# Patient Record
Sex: MALE | Race: WHITE | NOT HISPANIC OR LATINO | Employment: FULL TIME | URBAN - METROPOLITAN AREA
[De-identification: names, ages, dates, MRNs, and addresses within clinical notes are randomized per-mention and may not be internally consistent; named-entity substitution may affect disease eponyms.]

---

## 2017-09-29 DIAGNOSIS — Z00.00 ROUTINE GENERAL MEDICAL EXAMINATION AT A HEALTH CARE FACILITY: Primary | ICD-10-CM

## 2017-10-16 ENCOUNTER — DOCUMENTATION ONLY (OUTPATIENT)
Dept: CARDIOLOGY | Facility: CLINIC | Age: 47
End: 2017-10-16

## 2017-10-16 ENCOUNTER — CLINICAL SUPPORT (OUTPATIENT)
Dept: INTERNAL MEDICINE | Facility: CLINIC | Age: 47
End: 2017-10-16
Payer: COMMERCIAL

## 2017-10-16 ENCOUNTER — CLINICAL SUPPORT (OUTPATIENT)
Dept: AUDIOLOGY | Facility: CLINIC | Age: 47
End: 2017-10-16
Payer: COMMERCIAL

## 2017-10-16 ENCOUNTER — CLINICAL SUPPORT (OUTPATIENT)
Dept: CARDIOLOGY | Facility: CLINIC | Age: 47
End: 2017-10-16
Payer: COMMERCIAL

## 2017-10-16 ENCOUNTER — OFFICE VISIT (OUTPATIENT)
Dept: INTERNAL MEDICINE | Facility: CLINIC | Age: 47
End: 2017-10-16
Payer: COMMERCIAL

## 2017-10-16 ENCOUNTER — PROCEDURE VISIT (OUTPATIENT)
Dept: PULMONOLOGY | Facility: CLINIC | Age: 47
End: 2017-10-16
Payer: COMMERCIAL

## 2017-10-16 ENCOUNTER — HOSPITAL ENCOUNTER (OUTPATIENT)
Dept: RADIOLOGY | Facility: HOSPITAL | Age: 47
Discharge: HOME | End: 2017-10-16
Attending: FAMILY MEDICINE
Payer: COMMERCIAL

## 2017-10-16 VITALS
WEIGHT: 282.19 LBS | HEIGHT: 73 IN | RESPIRATION RATE: 14 BRPM | SYSTOLIC BLOOD PRESSURE: 122 MMHG | BODY MASS INDEX: 37.4 KG/M2 | DIASTOLIC BLOOD PRESSURE: 70 MMHG | HEART RATE: 66 BPM

## 2017-10-16 VITALS
SYSTOLIC BLOOD PRESSURE: 122 MMHG | HEIGHT: 73 IN | HEART RATE: 66 BPM | BODY MASS INDEX: 37.4 KG/M2 | TEMPERATURE: 97 F | DIASTOLIC BLOOD PRESSURE: 70 MMHG | WEIGHT: 282.19 LBS

## 2017-10-16 DIAGNOSIS — Z00.00 ANNUAL PHYSICAL EXAM: ICD-10-CM

## 2017-10-16 DIAGNOSIS — Z00.00 ROUTINE GENERAL MEDICAL EXAMINATION AT A HEALTH CARE FACILITY: ICD-10-CM

## 2017-10-16 DIAGNOSIS — Z00.00 ROUTINE GENERAL MEDICAL EXAMINATION AT A HEALTH CARE FACILITY: Primary | ICD-10-CM

## 2017-10-16 DIAGNOSIS — Z01.12 ENCOUNTER FOR HEARING CONSERVATION AND TREATMENT: Primary | ICD-10-CM

## 2017-10-16 DIAGNOSIS — Z00.00 ANNUAL PHYSICAL EXAM: Primary | ICD-10-CM

## 2017-10-16 DIAGNOSIS — E34.9 HYPOTESTOSTERONEMIA: ICD-10-CM

## 2017-10-16 LAB
ALBUMIN SERPL BCP-MCNC: 3.7 G/DL
ALP SERPL-CCNC: 78 U/L
ALT SERPL W/O P-5'-P-CCNC: 20 U/L
ANION GAP SERPL CALC-SCNC: 8 MMOL/L
AST SERPL-CCNC: 17 U/L
BILIRUB SERPL-MCNC: 0.6 MG/DL
BILIRUB UR QL STRIP: NEGATIVE
BUN SERPL-MCNC: 12 MG/DL
CALCIUM SERPL-MCNC: 9.7 MG/DL
CHLORIDE SERPL-SCNC: 107 MMOL/L
CHOLEST SERPL-MCNC: 166 MG/DL
CHOLEST/HDLC SERPL: 3.9 {RATIO}
CLARITY UR: CLEAR
CO2 SERPL-SCNC: 24 MMOL/L
COLOR UR: YELLOW
CREAT SERPL-MCNC: 0.8 MG/DL
DIASTOLIC DYSFUNCTION: NO
ERYTHROCYTE [DISTWIDTH] IN BLOOD BY AUTOMATED COUNT: 13.5 %
EST. GFR  (AFRICAN AMERICAN): >60 ML/MIN/1.73 M^2
EST. GFR  (NON AFRICAN AMERICAN): >60 ML/MIN/1.73 M^2
ESTIMATED AVG GLUCOSE: 111 MG/DL
GLUCOSE SERPL-MCNC: 111 MG/DL
GLUCOSE UR QL STRIP: NEGATIVE
HBA1C MFR BLD HPLC: 5.5 %
HCT VFR BLD AUTO: 44.1 %
HDLC SERPL-MCNC: 43 MG/DL
HDLC SERPL: 25.9 %
HGB BLD-MCNC: 15 G/DL
HGB UR QL STRIP: NEGATIVE
KETONES UR QL STRIP: NEGATIVE
LDLC SERPL CALC-MCNC: 104.2 MG/DL
LEUKOCYTE ESTERASE UR QL STRIP: NEGATIVE
MCH RBC QN AUTO: 28.6 PG
MCHC RBC AUTO-ENTMCNC: 34 G/DL
MCV RBC AUTO: 84 FL
NITRITE UR QL STRIP: NEGATIVE
NONHDLC SERPL-MCNC: 123 MG/DL
PH UR STRIP: 6 [PH] (ref 5–8)
PLATELET # BLD AUTO: 257 K/UL
PMV BLD AUTO: 9.6 FL
POTASSIUM SERPL-SCNC: 4.4 MMOL/L
PROT SERPL-MCNC: 7.2 G/DL
PROT UR QL STRIP: NEGATIVE
RBC # BLD AUTO: 5.25 M/UL
SODIUM SERPL-SCNC: 139 MMOL/L
SP GR UR STRIP: 1.02 (ref 1–1.03)
TRIGL SERPL-MCNC: 94 MG/DL
TSH SERPL DL<=0.005 MIU/L-ACNC: 0.69 UIU/ML
URN SPEC COLLECT METH UR: NORMAL
WBC # BLD AUTO: 6.98 K/UL

## 2017-10-16 PROCEDURE — 84402 ASSAY OF FREE TESTOSTERONE: CPT

## 2017-10-16 PROCEDURE — 94010 BREATHING CAPACITY TEST: CPT | Mod: 26,S$PBB,, | Performed by: INTERNAL MEDICINE

## 2017-10-16 PROCEDURE — 92552 PURE TONE AUDIOMETRY AIR: CPT | Mod: PBBFAC,PO | Performed by: AUDIOLOGIST

## 2017-10-16 PROCEDURE — 84443 ASSAY THYROID STIM HORMONE: CPT

## 2017-10-16 PROCEDURE — 99386 PREV VISIT NEW AGE 40-64: CPT | Mod: S$PBB,,, | Performed by: FAMILY MEDICINE

## 2017-10-16 PROCEDURE — 93005 ELECTROCARDIOGRAM TRACING: CPT | Mod: PBBFAC,PO | Performed by: NUCLEAR MEDICINE

## 2017-10-16 PROCEDURE — 80061 LIPID PANEL: CPT | Mod: PO

## 2017-10-16 PROCEDURE — 93018 CV STRESS TEST I&R ONLY: CPT | Mod: S$PBB,,, | Performed by: NUCLEAR MEDICINE

## 2017-10-16 PROCEDURE — 80053 COMPREHEN METABOLIC PANEL: CPT | Mod: PO

## 2017-10-16 PROCEDURE — 85027 COMPLETE CBC AUTOMATED: CPT | Mod: PO

## 2017-10-16 PROCEDURE — 99999 PR PBB SHADOW E&M-EST. PATIENT-LVL III: CPT | Mod: PBBFAC,,, | Performed by: FAMILY MEDICINE

## 2017-10-16 PROCEDURE — 83655 ASSAY OF LEAD: CPT

## 2017-10-16 PROCEDURE — 71020 XR CHEST PA AND LATERAL: CPT | Mod: TC,PO

## 2017-10-16 PROCEDURE — 94010 BREATHING CAPACITY TEST: CPT | Mod: PBBFAC,PO

## 2017-10-16 PROCEDURE — 93017 CV STRESS TEST TRACING ONLY: CPT | Mod: PBBFAC,PO | Performed by: NUCLEAR MEDICINE

## 2017-10-16 PROCEDURE — 83036 HEMOGLOBIN GLYCOSYLATED A1C: CPT

## 2017-10-16 PROCEDURE — 86703 HIV-1/HIV-2 1 RESULT ANTBDY: CPT

## 2017-10-16 PROCEDURE — 84153 ASSAY OF PSA TOTAL: CPT

## 2017-10-16 PROCEDURE — 81003 URINALYSIS AUTO W/O SCOPE: CPT | Mod: PO

## 2017-10-16 PROCEDURE — 71020 XR CHEST PA AND LATERAL: CPT | Mod: 26,,, | Performed by: RADIOLOGY

## 2017-10-16 PROCEDURE — 93016 CV STRESS TEST SUPVJ ONLY: CPT | Mod: S$PBB,,, | Performed by: NUCLEAR MEDICINE

## 2017-10-16 PROCEDURE — 93010 ELECTROCARDIOGRAM REPORT: CPT | Mod: S$PBB,,, | Performed by: NUCLEAR MEDICINE

## 2017-10-16 RX ORDER — CETIRIZINE HYDROCHLORIDE 10 MG/1
10 TABLET ORAL DAILY
COMMUNITY

## 2017-10-16 RX ORDER — TESTOSTERONE 30 MG/1.5ML
SOLUTION TOPICAL
COMMUNITY
Start: 2014-02-28 | End: 2018-10-11

## 2017-10-16 NOTE — PROGRESS NOTES
Subjective:       Patient ID: Narendra Watson is a 47 y.o. male.    Chief Complaint: Annual Exam    Annual exam:      Pt is a 47 year old who has no ongoing medical issues except for hypotestosteronism. Father  at 61 from CAD.      Review of Systems   Constitutional: Negative.    HENT: Negative.    Respiratory: Negative.    Cardiovascular: Negative.    Gastrointestinal: Negative.    Skin: Negative.    Neurological: Negative.    Psychiatric/Behavioral: Negative.        Objective:      Physical Exam   Constitutional: He is oriented to person, place, and time. He appears well-developed and well-nourished.   Cardiovascular: Normal rate and regular rhythm.    Pulmonary/Chest: Effort normal and breath sounds normal.   Abdominal: Soft. Bowel sounds are normal.   Neurological: He is alert and oriented to person, place, and time.   Skin: Skin is warm and dry.   Psychiatric: He has a normal mood and affect. His behavior is normal.       Assessment:       1. Annual physical exam    2. Hypotestosteronemia        Plan:       Annual physical exam  Comments:  Overall good health.  Orders:  -     PSA, Screening; Future; Expected date: 10/16/2017  -     Testosterone, free; Future; Expected date: 10/16/2017    Hypotestosteronemia  Comments:  Continue on the Testosterone

## 2017-10-16 NOTE — PROGRESS NOTES
Pt presented today for executive health check with exercise treadmill stress test - he had 2 beat runs of VT at peak exercise. No CP, no palpitations, no other sys noted.

## 2017-10-16 NOTE — PROGRESS NOTES
Executive Health Screening    Narendra Watson was seen 10/16/2017 for an [facility] hearing screen.  He is a bilateral cochlear implant user.  He has been using his right cochlear implant for 2 years, and his left for 3 years. He coninues care and maintenance at [facility].    Results reveal a profound hearing loss 250-8000 Hz for the right ear, and  profound hearing loss 250-8000 Hz for the left ear.     Otoscopy was unremarkable.    Patient was counseled on the above findings.

## 2017-10-17 LAB
CITY: NORMAL
COMPLEXED PSA SERPL-MCNC: 1.7 NG/ML
COUNTY: NORMAL
GUARDIAN FIRST NAME: NORMAL
GUARDIAN LAST NAME: NORMAL
HIV 1+2 AB+HIV1 P24 AG SERPL QL IA: NEGATIVE
LEAD BLD-MCNC: <1 MCG/DL (ref 0–4.9)
PHONE #: NORMAL
POSTAL CODE: NORMAL
RACE: NORMAL
SPECIMEN SOURCE: NORMAL
STATE OF RESIDENCE: NORMAL
STREET ADDRESS: NORMAL

## 2017-10-18 LAB — TESTOST FREE SERPL-MCNC: 7.7 PG/ML

## 2017-11-18 LAB
PRE FEV1 FVC: 71.9 % (ref 68.88–88.24)
PRE FEV1: 3.45 L (ref 3.58–5.29)
PRE FVC: 4.8 L (ref 4.66–6.69)
PRE PEF: 6.76 L/S (ref 8.17–13.21)

## 2018-01-15 PROBLEM — Z00.00 ANNUAL PHYSICAL EXAM: Status: RESOLVED | Noted: 2017-10-16 | Resolved: 2018-01-15

## 2018-09-18 DIAGNOSIS — Z00.00 ROUTINE GENERAL MEDICAL EXAMINATION AT A HEALTH CARE FACILITY: Primary | ICD-10-CM

## 2018-09-27 ENCOUNTER — PATIENT OUTREACH (OUTPATIENT)
Dept: ADMINISTRATIVE | Facility: HOSPITAL | Age: 48
End: 2018-09-27

## 2018-10-11 ENCOUNTER — CLINICAL SUPPORT (OUTPATIENT)
Dept: AUDIOLOGY | Facility: CLINIC | Age: 48
End: 2018-10-11

## 2018-10-11 ENCOUNTER — CLINICAL SUPPORT (OUTPATIENT)
Dept: PULMONOLOGY | Facility: CLINIC | Age: 48
End: 2018-10-11

## 2018-10-11 ENCOUNTER — OFFICE VISIT (OUTPATIENT)
Dept: INTERNAL MEDICINE | Facility: CLINIC | Age: 48
End: 2018-10-11
Payer: COMMERCIAL

## 2018-10-11 ENCOUNTER — CLINICAL SUPPORT (OUTPATIENT)
Dept: INTERNAL MEDICINE | Facility: CLINIC | Age: 48
End: 2018-10-11
Payer: COMMERCIAL

## 2018-10-11 VITALS
BODY MASS INDEX: 33.9 KG/M2 | DIASTOLIC BLOOD PRESSURE: 72 MMHG | SYSTOLIC BLOOD PRESSURE: 132 MMHG | RESPIRATION RATE: 14 BRPM | WEIGHT: 255.75 LBS | HEIGHT: 73 IN | HEART RATE: 56 BPM

## 2018-10-11 DIAGNOSIS — E34.9 HYPOTESTOSTERONEMIA: ICD-10-CM

## 2018-10-11 DIAGNOSIS — Z00.00 ROUTINE GENERAL MEDICAL EXAMINATION AT A HEALTH CARE FACILITY: Primary | ICD-10-CM

## 2018-10-11 DIAGNOSIS — Z00.00 ENCOUNTER FOR PREVENTIVE HEALTH EXAMINATION: ICD-10-CM

## 2018-10-11 DIAGNOSIS — Z00.00 ENCOUNTER FOR PREVENTIVE HEALTH EXAMINATION: Primary | ICD-10-CM

## 2018-10-11 DIAGNOSIS — Z01.12 HEARING CONSERVATION AND TREATMENT EXAM: Primary | ICD-10-CM

## 2018-10-11 DIAGNOSIS — J30.1 SEASONAL ALLERGIC RHINITIS DUE TO POLLEN: ICD-10-CM

## 2018-10-11 DIAGNOSIS — Z00.00 ROUTINE GENERAL MEDICAL EXAMINATION AT A HEALTH CARE FACILITY: ICD-10-CM

## 2018-10-11 LAB
ALBUMIN SERPL BCP-MCNC: 4 G/DL
ALP SERPL-CCNC: 70 U/L
ALT SERPL W/O P-5'-P-CCNC: 17 U/L
ANION GAP SERPL CALC-SCNC: 10 MMOL/L
AST SERPL-CCNC: 16 U/L
BILIRUB SERPL-MCNC: 0.6 MG/DL
BILIRUB UR QL STRIP: NEGATIVE
BRPFT: NORMAL
BUN SERPL-MCNC: 15 MG/DL
CALCIUM SERPL-MCNC: 9.9 MG/DL
CHLORIDE SERPL-SCNC: 106 MMOL/L
CHOLEST SERPL-MCNC: 153 MG/DL
CHOLEST/HDLC SERPL: 4.3 {RATIO}
CLARITY UR: CLEAR
CO2 SERPL-SCNC: 25 MMOL/L
COLOR UR: YELLOW
COMPLEXED PSA SERPL-MCNC: 0.89 NG/ML
CREAT SERPL-MCNC: 0.8 MG/DL
ERYTHROCYTE [DISTWIDTH] IN BLOOD BY AUTOMATED COUNT: 13.7 %
EST. GFR  (AFRICAN AMERICAN): >60 ML/MIN/1.73 M^2
EST. GFR  (NON AFRICAN AMERICAN): >60 ML/MIN/1.73 M^2
ESTIMATED AVG GLUCOSE: 103 MG/DL
FEF 25 75 LLN: 2.21
FEF 25 75 PRE REF: 62.2 %
FEF 25 75 REF: 3.99
FEV1 FVC LLN: 68
FEV1 FVC PRE REF: 89 %
FEV1 FVC REF: 79
FEV1 LLN: 3.44
FEV1 PRE REF: 83.9 %
FEV1 REF: 4.38
FEV6 LLN: 4.47
FEV6 PRE REF: 90.7 %
FEV6 PRE: 4.94 L (ref 4.47–6.43)
FEV6 REF: 5.45
FVC LLN: 4.38
FVC PRE REF: 93.8 %
FVC REF: 5.58
GLUCOSE SERPL-MCNC: 106 MG/DL
GLUCOSE UR QL STRIP: NEGATIVE
HBA1C MFR BLD HPLC: 5.2 %
HCT VFR BLD AUTO: 44.5 %
HDLC SERPL-MCNC: 36 MG/DL
HDLC SERPL: 23.5 %
HGB BLD-MCNC: 14.8 G/DL
HGB UR QL STRIP: NEGATIVE
KETONES UR QL STRIP: NEGATIVE
LDLC SERPL CALC-MCNC: 101.8 MG/DL
LEUKOCYTE ESTERASE UR QL STRIP: NEGATIVE
MCH RBC QN AUTO: 28.5 PG
MCHC RBC AUTO-ENTMCNC: 33.3 G/DL
MCV RBC AUTO: 86 FL
MVV LLN: 139
MVV REF: 164
NITRITE UR QL STRIP: NEGATIVE
NONHDLC SERPL-MCNC: 117 MG/DL
PEF LLN: 8.1
PEF PRE REF: 83 %
PEF REF: 10.61
PH UR STRIP: 6 [PH] (ref 5–8)
PLATELET # BLD AUTO: 244 K/UL
PMV BLD AUTO: 9.6 FL
POTASSIUM SERPL-SCNC: 4.2 MMOL/L
PRE FEF 25 75: 2.48 L/S (ref 2.21–5.77)
PRE FET 100: 13.14 SEC
PRE FEV1 FVC: 70.3 % (ref 68.36–89.65)
PRE FEV1: 3.68 L (ref 3.44–5.32)
PRE FVC: 5.23 L (ref 4.38–6.77)
PRE PEF: 8.8 L/S (ref 8.1–13.12)
PROT SERPL-MCNC: 7 G/DL
PROT UR QL STRIP: NEGATIVE
RBC # BLD AUTO: 5.19 M/UL
SODIUM SERPL-SCNC: 141 MMOL/L
SP GR UR STRIP: 1.02 (ref 1–1.03)
TESTOST SERPL-MCNC: 159 NG/DL
TRIGL SERPL-MCNC: 76 MG/DL
TSH SERPL DL<=0.005 MIU/L-ACNC: 0.74 UIU/ML
URN SPEC COLLECT METH UR: NORMAL
WBC # BLD AUTO: 6.83 K/UL

## 2018-10-11 PROCEDURE — 85027 COMPLETE CBC AUTOMATED: CPT | Mod: PO

## 2018-10-11 PROCEDURE — 83655 ASSAY OF LEAD: CPT

## 2018-10-11 PROCEDURE — 81003 URINALYSIS AUTO W/O SCOPE: CPT | Mod: PO

## 2018-10-11 PROCEDURE — 99999 PR PBB SHADOW E&M-EST. PATIENT-LVL I: CPT | Mod: PBBFAC,,, | Performed by: INTERNAL MEDICINE

## 2018-10-11 PROCEDURE — 99396 PREV VISIT EST AGE 40-64: CPT | Mod: 25,S$GLB,, | Performed by: INTERNAL MEDICINE

## 2018-10-11 PROCEDURE — 90471 IMMUNIZATION ADMIN: CPT | Mod: S$GLB,,, | Performed by: INTERNAL MEDICINE

## 2018-10-11 PROCEDURE — 84403 ASSAY OF TOTAL TESTOSTERONE: CPT

## 2018-10-11 PROCEDURE — 83036 HEMOGLOBIN GLYCOSYLATED A1C: CPT

## 2018-10-11 PROCEDURE — 84443 ASSAY THYROID STIM HORMONE: CPT

## 2018-10-11 PROCEDURE — 94010 BREATHING CAPACITY TEST: CPT | Mod: S$GLB,,, | Performed by: INTERNAL MEDICINE

## 2018-10-11 PROCEDURE — 84402 ASSAY OF FREE TESTOSTERONE: CPT

## 2018-10-11 PROCEDURE — 90686 IIV4 VACC NO PRSV 0.5 ML IM: CPT | Mod: S$GLB,,, | Performed by: INTERNAL MEDICINE

## 2018-10-11 PROCEDURE — 80061 LIPID PANEL: CPT | Mod: PO

## 2018-10-11 PROCEDURE — 84153 ASSAY OF PSA TOTAL: CPT

## 2018-10-11 PROCEDURE — 80053 COMPREHEN METABOLIC PANEL: CPT | Mod: PO

## 2018-10-11 RX ORDER — FLUTICASONE PROPIONATE 50 MCG
2 SPRAY, SUSPENSION (ML) NASAL
COMMUNITY
Start: 2018-04-11 | End: 2019-04-11

## 2018-10-11 RX ORDER — TESTOSTERONE CYPIONATE 1000 MG/10ML
INJECTION, SOLUTION INTRAMUSCULAR
COMMUNITY

## 2018-10-11 RX ORDER — ZOLPIDEM TARTRATE 10 MG/1
10 TABLET ORAL
COMMUNITY
Start: 2015-11-05

## 2018-10-11 RX ORDER — NYSTATIN 100000 U/G
OINTMENT TOPICAL
COMMUNITY
Start: 2018-04-13 | End: 2021-10-18

## 2018-10-11 RX ORDER — CELECOXIB 200 MG/1
200 CAPSULE ORAL
COMMUNITY
Start: 2015-12-28

## 2018-10-11 RX ORDER — TRIAMCINOLONE ACETONIDE 0.25 MG/G
OINTMENT TOPICAL
COMMUNITY
Start: 2018-04-13 | End: 2021-10-18

## 2018-10-11 NOTE — PROGRESS NOTES
Executive Health Screening     Narendra Watson was seen 10/11/2018 for an [facility] hearing screen.  He is a bilateral cochlear implant user.  He has been using his right cochlear implant for 3 years, and his left for 4 years. He coninues care and maintenance at [facility]. He declined audio testing today due to not having any residual hearing without the use of his cochlear implants.     Previous results from 10/16/2017 revealed a profound hearing loss 250-8000 Hz for the right ear, and  profound hearing loss 250-8000 Hz for the left ear.      Otoscopy was unremarkable at that time.     Patient was counseled on the above findings and encouraged to follow-up with [facility] for cochlear implant management.

## 2018-10-11 NOTE — LETTER
October 17, 2018    Narendra Watson  [address] [place] [address]             [facility]  [address] [place] [address]  Phone: [phone]  Fax: [phone] [facility] #[id number]    Dear Mr. Watson:    It was a pleasure to meet you and perform your  executive physical on October 11, 2018.  At the time of  your visit, you are 48 years old.  You are active at  work, but do not participate in any regular exercise  activity.  You have been taking testosterone for the  past four months and feel a good energy related to  this.  You have lost 27 pounds with diet alone.  You  denied any fevers, sweats or coughing problems.  You  have had no chest pain, shortness of breath or  palpitations.  Your bowel movements have been good with  fiber supplementation.  You report one time a night  urination.    Past medical history is significant for allergic  rhinitis, hypotestosterone, back surgery, inner ear  surgery.    Your medications include Celebrex, Zyrtec, Flonase,  testosterone, and Ambien p.r.n.    You have no known medication allergies.    Your health maintenance includes October 2018, Fluvax;  December 2014, Prevnar vaccination; July 2018, tetanus,  diphtheria, pertussis vaccination.    On physical exam, your height is 6 feet 1 inch, weight  255 pounds with a body mass index 33.74.  This is in  the overweight range.  Your blood pressure is 132/72,  which is very good.  Your head and neck exam is normal.   Your heart has a regular rate and rhythm with no  abnormal sounds.  Your lungs are clear throughout with  a normal respiratory effort.  Your abdomen is soft with  normal bowel sounds.  No masses or enlarged organs are  noted.  Your extremities have strong distal pulses with  no swelling.    Your labs reveal a normal blood count, platelets 244,  normal.  Your kidney and liver function is normal.   Your fasting glucose is 106, normal.  Total cholesterol  153, HDL 36, ,  "triglycerides 76.  On this  cholesterol panel, your HDL or "good cholesterol" is  low.  This may improve with starting an exercise  regimen plus eating "good fats," which include nuts,  fish, olive oil, avocados.  Hemoglobin A1c 5.2%,  normal.  TSH 0.738, normal.  PSA 0.89, which is good,  normal.  Total testosterone is low at 159, free  testosterone is low at 3.6.  Your pulmonary function  testing is normal.  Your urinalysis is normal.     In summary, you appear to be in pretty good health in  general.  You do need to start some exercise, maybe  just walking 30 minutes four days a week, which would  perhaps help your good cholesterol and also help your  general cardiac status.  Your calculated 10-year risk  of having a heart attack is nice and low at 2.6%.  You  are up to date on all vaccinations and preventive care  needed at this time.    It was a pleasure to see you and perform your executive  physical.  If I can be of any further assistance or if  you have any other questions or concerns, please do not  hesitate to let me know.    Yours very truly,    Kath Aponte M.D.      SHANNAN  dd: 10/17/2018 12:13:59 (CDT)  td: 10/18/2018 05:43:47 (CDT)  Doc ID   #[id number]  Job ID #[id number]    CC:          "

## 2018-10-11 NOTE — PROGRESS NOTES
Subjective:      Patient ID: Narendra Watson is a 48 y.o. male.    Chief Complaint: No chief complaint on file.      HPI  Pt here for second year of Executive physical with [employer], last year Dr. Asif.  No exercising, but active at work.  IM test x 4mo, feels good energy.  Has lost 27# with diet alone.  No f/c/sw/cough.  No cp/sob/palp.  BMs ok with fiber.  Urine 1x nocturia.        Past Medical History:   Diagnosis Date    Allergic rhinitis     Hypotestosteronism        Past Surgical History:   Procedure Laterality Date    BACK SURGERY      INNER EAR SURGERY       SH:  No tob, occas EtOH, .    FH:  Uncle with brain tumor.    HM:  10/18 today fluvax, 12/14 [id number], 7/18 TDaP.      Review of Systems   Constitutional: Negative for appetite change, chills, diaphoresis, fatigue and fever.   HENT: Negative for congestion, ear pain, rhinorrhea and sinus pressure.    Respiratory: Negative for cough and shortness of breath.    Cardiovascular: Negative for chest pain and palpitations.   Gastrointestinal: Negative for abdominal distention, abdominal pain, blood in stool, constipation, diarrhea, nausea and vomiting.   Genitourinary: Negative for difficulty urinating, dysuria, frequency, hematuria and urgency.   Musculoskeletal: Negative for arthralgias.   Skin: Negative for rash.   Neurological: Negative for dizziness and headaches.   Psychiatric/Behavioral: The patient is not nervous/anxious.          Objective:   There were no vitals taken for this visit.    Physical Exam   Constitutional: He is oriented to person, place, and time. He appears well-developed and well-nourished.   HENT:   Right Ear: External ear normal. Tympanic membrane is not injected.   Left Ear: External ear normal. Tympanic membrane is not injected.   Mouth/Throat: Oropharynx is clear and moist.   Eyes: Conjunctivae are normal.   Neck: Normal range of motion. Neck supple. No thyromegaly present.   Cardiovascular: Normal rate, regular  rhythm and intact distal pulses. Exam reveals no gallop and no friction rub.   No murmur heard.  Pulmonary/Chest: Effort normal and breath sounds normal. He has no wheezes. He has no rales.   Abdominal: Soft. Bowel sounds are normal. He exhibits no mass. There is no tenderness.   Musculoskeletal: He exhibits no edema.   Lymphadenopathy:     He has no cervical adenopathy.   Neurological: He is alert and oriented to person, place, and time.   Psychiatric: He has a normal mood and affect.       Results for SALENA VANCE (MRN [id number]) as of 10/11/2018 09:30   Ref. Range 10/11/2018 08:06   WBC Latest Ref Range: 3.90 - 12.70 K/uL 6.83   RBC Latest Ref Range: 4.60 - 6.20 M/uL 5.19   Hemoglobin Latest Ref Range: 14.0 - 18.0 g/dL 14.8   Hematocrit Latest Ref Range: 40.0 - 54.0 % 44.5   MCV Latest Ref Range: 82 - 98 fL 86   MCH Latest Ref Range: 27.0 - 31.0 pg 28.5   MCHC Latest Ref Range: 32.0 - 36.0 g/dL 33.3   RDW Latest Ref Range: 11.5 - 14.5 % 13.7   Platelets Latest Ref Range: 150 - 350 K/uL 244   MPV Latest Ref Range: 9.2 - 12.9 fL 9.6   Sodium Latest Ref Range: 136 - 145 mmol/L 141   Potassium Latest Ref Range: 3.5 - 5.1 mmol/L 4.2   Chloride Latest Ref Range: 95 - 110 mmol/L 106   CO2 Latest Ref Range: 23 - 29 mmol/L 25   Anion Gap Latest Ref Range: 8 - 16 mmol/L 10   BUN, Bld Latest Ref Range: 6 - 20 mg/dL 15   Creatinine Latest Ref Range: 0.5 - 1.4 mg/dL 0.8   eGFR if non African American Latest Ref Range: >60 mL/min/1.73 m^2 >60   eGFR if  Latest Ref Range: >60 mL/min/1.73 m^2 >60   Glucose Latest Ref Range: 70 - 110 mg/dL 106   Calcium Latest Ref Range: 8.7 - 10.5 mg/dL 9.9   Alkaline Phosphatase Latest Ref Range: 55 - 135 U/L 70   Total Protein Latest Ref Range: 6.0 - 8.4 g/dL 7.0   Albumin Latest Ref Range: 3.5 - 5.2 g/dL 4.0   Total Bilirubin Latest Ref Range: 0.1 - 1.0 mg/dL 0.6   AST Latest Ref Range: 10 - 40 U/L 16   ALT Latest Ref Range: 10 - 44 U/L 17   Triglycerides Latest Ref Range: 30  - 150 mg/dL 76   Cholesterol Latest Ref Range: 120 - 199 mg/dL 153   HDL Latest Ref Range: 40 - 75 mg/dL 36 (L)   LDL Cholesterol Latest Ref Range: 63.0 - 159.0 mg/dL 101.8   Total Cholesterol/HDL Ratio Latest Ref Range: 2.0 - 5.0  4.3   Specimen UA Unknown Urine, Clean Catch   Color, UA Latest Ref Range: Yellow, Straw, Mireya  Yellow   pH, UA Latest Ref Range: 5.0 - 8.0  6.0   Specific Gravity, UA Latest Ref Range: 1.005 - 1.030  1.025   Appearance, UA Latest Ref Range: Clear  Clear   Protein, UA Latest Ref Range: Negative  Negative   Glucose, UA Latest Ref Range: Negative  Negative   Ketones, UA Latest Ref Range: Negative  Negative   Occult Blood UA Latest Ref Range: Negative  Negative   Nitrite, UA Latest Ref Range: Negative  Negative   Bilirubin (UA) Latest Ref Range: Negative  Negative   Leukocytes, UA Latest Ref Range: Negative  Negative         Assessment:       1. Encounter for preventive health examination    2. Hypotestosteronemia    3. Seasonal allergic rhinitis due to pollen          Plan:     Encounter for preventive health examination- Report results when available.  -     PSA, Screening; Future; Expected date: 10/11/2018    Hypotestosteronemia  -     PSA, Screening; Future; Expected date: 10/11/2018  -     Testosterone; Future; Expected date: 10/11/2018  -     Testosterone, free; Future; Expected date: 10/11/2018    Seasonal allergic rhinitis due to pollen

## 2018-10-12 LAB
CITY: NORMAL
COUNTY: NORMAL
GUARDIAN FIRST NAME: NORMAL
GUARDIAN LAST NAME: NORMAL
LEAD, BLOOD: <1 MCG/DL (ref 0–4.9)
PHONE #: NORMAL
POSTAL CODE: NORMAL
RACE: NORMAL
SPECIMEN SOURCE: NORMAL
STATE OF RESIDENCE: NORMAL
STREET ADDRESS: NORMAL

## 2018-10-15 LAB — TESTOST FREE SERPL-MCNC: 3.6 PG/ML

## 2019-09-05 DIAGNOSIS — Z00.00 ROUTINE GENERAL MEDICAL EXAMINATION AT A HEALTH CARE FACILITY: Primary | ICD-10-CM

## 2019-10-02 DIAGNOSIS — Z00.00 ROUTINE GENERAL MEDICAL EXAMINATION AT A HEALTH CARE FACILITY: Primary | ICD-10-CM

## 2019-10-22 ENCOUNTER — OFFICE VISIT (OUTPATIENT)
Dept: INTERNAL MEDICINE | Facility: CLINIC | Age: 49
End: 2019-10-22
Payer: COMMERCIAL

## 2019-10-22 ENCOUNTER — CLINICAL SUPPORT (OUTPATIENT)
Dept: INTERNAL MEDICINE | Facility: CLINIC | Age: 49
End: 2019-10-22
Payer: COMMERCIAL

## 2019-10-22 ENCOUNTER — CLINICAL SUPPORT (OUTPATIENT)
Dept: INTERNAL MEDICINE | Facility: CLINIC | Age: 49
End: 2019-10-22

## 2019-10-22 ENCOUNTER — CLINICAL SUPPORT (OUTPATIENT)
Dept: PULMONOLOGY | Facility: CLINIC | Age: 49
End: 2019-10-22

## 2019-10-22 ENCOUNTER — CLINICAL SUPPORT (OUTPATIENT)
Dept: AUDIOLOGY | Facility: CLINIC | Age: 49
End: 2019-10-22

## 2019-10-22 VITALS
RESPIRATION RATE: 16 BRPM | HEIGHT: 73 IN | BODY MASS INDEX: 34.19 KG/M2 | HEART RATE: 65 BPM | WEIGHT: 257.94 LBS | SYSTOLIC BLOOD PRESSURE: 118 MMHG | DIASTOLIC BLOOD PRESSURE: 77 MMHG

## 2019-10-22 DIAGNOSIS — Z01.12 HEARING CONSERVATION AND TREATMENT EXAM: Primary | ICD-10-CM

## 2019-10-22 DIAGNOSIS — Z00.00 ROUTINE GENERAL MEDICAL EXAMINATION AT A HEALTH CARE FACILITY: Primary | ICD-10-CM

## 2019-10-22 DIAGNOSIS — Z00.00 ROUTINE GENERAL MEDICAL EXAMINATION AT A HEALTH CARE FACILITY: ICD-10-CM

## 2019-10-22 PROBLEM — H69.93 EUSTACHIAN TUBE DYSFUNCTION, BILATERAL: Status: ACTIVE | Noted: 2018-08-20

## 2019-10-22 PROBLEM — R51.9 HEADACHE: Status: ACTIVE | Noted: 2019-10-22

## 2019-10-22 LAB
ALBUMIN SERPL BCP-MCNC: 3.9 G/DL (ref 3.5–5.2)
ALP SERPL-CCNC: 69 U/L (ref 55–135)
ALT SERPL W/O P-5'-P-CCNC: 24 U/L (ref 10–44)
ANION GAP SERPL CALC-SCNC: 8 MMOL/L (ref 8–16)
AST SERPL-CCNC: 18 U/L (ref 10–40)
BILIRUB SERPL-MCNC: 0.6 MG/DL (ref 0.1–1)
BILIRUB UR QL STRIP: NEGATIVE
BRPFT: NORMAL
BUN SERPL-MCNC: 12 MG/DL (ref 6–20)
CALCIUM SERPL-MCNC: 9.5 MG/DL (ref 8.7–10.5)
CHLORIDE SERPL-SCNC: 111 MMOL/L (ref 95–110)
CHOLEST SERPL-MCNC: 150 MG/DL (ref 120–199)
CHOLEST/HDLC SERPL: 3.8 {RATIO} (ref 2–5)
CLARITY UR: CLEAR
CO2 SERPL-SCNC: 22 MMOL/L (ref 23–29)
COLOR UR: YELLOW
CREAT SERPL-MCNC: 1 MG/DL (ref 0.5–1.4)
ERYTHROCYTE [DISTWIDTH] IN BLOOD BY AUTOMATED COUNT: 13.2 % (ref 11.5–14.5)
EST. GFR  (AFRICAN AMERICAN): >60 ML/MIN/1.73 M^2
EST. GFR  (NON AFRICAN AMERICAN): >60 ML/MIN/1.73 M^2
ESTIMATED AVG GLUCOSE: 108 MG/DL (ref 68–131)
FEF 25 75 LLN: 2.12
FEF 25 75 PRE REF: 74.5 %
FEF 25 75 REF: 3.86
FEV1 FVC LLN: 68
FEV1 FVC PRE REF: 93.8 %
FEV1 FVC REF: 79
FEV1 LLN: 3.33
FEV1 PRE REF: 86.6 %
FEV1 REF: 4.24
FVC LLN: 4.24
FVC PRE REF: 91.9 %
FVC REF: 5.4
GLUCOSE SERPL-MCNC: 103 MG/DL (ref 70–110)
GLUCOSE UR QL STRIP: NEGATIVE
HBA1C MFR BLD HPLC: 5.4 % (ref 4–5.6)
HCT VFR BLD AUTO: 47.4 % (ref 40–54)
HDLC SERPL-MCNC: 40 MG/DL (ref 40–75)
HDLC SERPL: 26.7 % (ref 20–50)
HGB BLD-MCNC: 15.9 G/DL (ref 14–18)
HGB UR QL STRIP: NEGATIVE
KETONES UR QL STRIP: NEGATIVE
LDLC SERPL CALC-MCNC: 82.8 MG/DL (ref 63–159)
LEUKOCYTE ESTERASE UR QL STRIP: NEGATIVE
MCH RBC QN AUTO: 28.9 PG (ref 27–31)
MCHC RBC AUTO-ENTMCNC: 33.5 G/DL (ref 32–36)
MCV RBC AUTO: 86 FL (ref 82–98)
NITRITE UR QL STRIP: NEGATIVE
NONHDLC SERPL-MCNC: 110 MG/DL
PEF LLN: 7.93
PEF PRE REF: 81.6 %
PEF REF: 10.38
PH UR STRIP: 6 [PH] (ref 5–8)
PLATELET # BLD AUTO: 280 K/UL (ref 150–350)
PMV BLD AUTO: 9.6 FL (ref 9.2–12.9)
POTASSIUM SERPL-SCNC: 4.3 MMOL/L (ref 3.5–5.1)
PRE FEF 25 75: 2.87 L/S (ref 2.12–5.6)
PRE FET 100: 6.88 SEC
PRE FEV1 FVC: 74.09 % (ref 68.25–89.72)
PRE FEV1: 3.68 L (ref 3.33–5.16)
PRE FVC: 4.96 L (ref 4.24–6.57)
PRE PEF: 8.47 L/S (ref 7.93–12.84)
PROT SERPL-MCNC: 7 G/DL (ref 6–8.4)
PROT UR QL STRIP: NEGATIVE
RBC # BLD AUTO: 5.51 M/UL (ref 4.6–6.2)
SODIUM SERPL-SCNC: 141 MMOL/L (ref 136–145)
SP GR UR STRIP: 1.01 (ref 1–1.03)
TRIGL SERPL-MCNC: 136 MG/DL (ref 30–150)
TSH SERPL DL<=0.005 MIU/L-ACNC: 0.91 UIU/ML (ref 0.4–4)
URN SPEC COLLECT METH UR: NORMAL
WBC # BLD AUTO: 7.54 K/UL (ref 3.9–12.7)

## 2019-10-22 PROCEDURE — 80053 COMPREHEN METABOLIC PANEL: CPT

## 2019-10-22 PROCEDURE — 83036 HEMOGLOBIN GLYCOSYLATED A1C: CPT

## 2019-10-22 PROCEDURE — 84443 ASSAY THYROID STIM HORMONE: CPT

## 2019-10-22 PROCEDURE — 81003 URINALYSIS AUTO W/O SCOPE: CPT

## 2019-10-22 PROCEDURE — 80061 LIPID PANEL: CPT

## 2019-10-22 PROCEDURE — 99999 PR PBB SHADOW E&M-EST. PATIENT-LVL III: ICD-10-PCS | Mod: PBBFAC,,, | Performed by: FAMILY MEDICINE

## 2019-10-22 PROCEDURE — 94010 BREATHING CAPACITY TEST: CPT | Mod: S$GLB,,, | Performed by: INTERNAL MEDICINE

## 2019-10-22 PROCEDURE — 99396 PR PREVENTIVE VISIT,EST,40-64: ICD-10-PCS | Mod: S$GLB,,, | Performed by: FAMILY MEDICINE

## 2019-10-22 PROCEDURE — 99396 PREV VISIT EST AGE 40-64: CPT | Mod: S$GLB,,, | Performed by: FAMILY MEDICINE

## 2019-10-22 PROCEDURE — 94010 BREATHING CAPACITY TEST: ICD-10-PCS | Mod: S$GLB,,, | Performed by: INTERNAL MEDICINE

## 2019-10-22 PROCEDURE — 83655 ASSAY OF LEAD: CPT

## 2019-10-22 PROCEDURE — 99999 PR PBB SHADOW E&M-EST. PATIENT-LVL III: CPT | Mod: PBBFAC,,, | Performed by: FAMILY MEDICINE

## 2019-10-22 PROCEDURE — 85027 COMPLETE CBC AUTOMATED: CPT

## 2019-10-22 RX ORDER — ONDANSETRON 4 MG/1
TABLET, FILM COATED ORAL
Refills: 2 | COMMUNITY
Start: 2019-10-01

## 2019-10-22 RX ORDER — DIAZEPAM 5 MG/1
TABLET ORAL
COMMUNITY
Start: 2019-01-25 | End: 2019-10-22

## 2019-10-22 RX ORDER — LORAZEPAM 1 MG/1
1 TABLET ORAL EVERY 8 HOURS PRN
COMMUNITY
Start: 2019-05-14 | End: 2019-10-22

## 2019-10-22 RX ORDER — RIZATRIPTAN BENZOATE 10 MG/1
10 TABLET ORAL
COMMUNITY

## 2019-10-22 RX ORDER — MECLIZINE HYDROCHLORIDE 25 MG/1
TABLET ORAL
COMMUNITY
Start: 2019-01-23

## 2019-10-22 RX ORDER — HYDROXYZINE HYDROCHLORIDE 50 MG/1
TABLET, FILM COATED ORAL
COMMUNITY
Start: 2019-04-08 | End: 2019-10-22

## 2019-10-22 RX ORDER — TOPIRAMATE 50 MG/1
50 TABLET, FILM COATED ORAL 2 TIMES DAILY
COMMUNITY
End: 2021-10-18

## 2019-10-22 NOTE — PROGRESS NOTES
Subjective:   Patient ID: Narendra Watson is a 49 y.o. male.  Chief Complaint:  Executive Health      Executive health evaluation 3.   PCP Ted Little. Neurologist Dr. Jerome Richardson.    Past medical history for low testosterone, migraine headaches, allergic rhinitis, insomnia, vertigo.    Past surgical history for back surgery and inner ear surgery.    Current medications include Topamax 25 mg twice a day, Maxalt 10 mg daily as needed, Celebrex 200 mg daily, Depo-Testosterone injections, Zyrtec 10 mg daily, nystatin cream as needed, Kenalog cream as needed, Antivert 25 mg as needed, Zofran 4 mg as needed, and Ambien 10 mg as needed.    No known drug allergies  Social history employed by [employer].  .  One daughter.  No tobacco illicit drug use.  Social alcohol use.    Family history includes father, , diabetes, coronary artery disease, heart attack.  Mother, , Alzheimer's.  Brother  diabetes and pulmonary embolus.  No known colon or prostate cancer.    Routine health maintenance include tetanus booster 2018 and flu vaccine 2019.    No specific complaints concerns today.      Current Outpatient Medications:     celecoxib (CELEBREX) 200 MG capsule, Take 200 mg by mouth., Disp: , Rfl:     cetirizine (ZYRTEC) 10 MG tablet, Take 10 mg by mouth once daily., Disp: , Rfl:     meclizine (ANTIVERT) 25 mg tablet, TAKE ONE TABLET FOUR TIMES DAILY AS NEEDED FOR DIZZINESS, Disp: , Rfl:     nystatin (MYCOSTATIN) ointment, Mix with kenalog and Instill fingertip full to both ears twice a month, Disp: , Rfl:     ondansetron (ZOFRAN) 4 MG tablet, TAKE ONE TABLET EVERY 8 HOURS AS NEEDED FOR NAUSEA, Disp: , Rfl: 2    rizatriptan (MAXALT) 10 MG tablet, Take 10 mg by mouth as needed for Migraine., Disp: , Rfl:     testosterone cypionate (DEPOTESTOTERONE CYPIONATE) 100 mg/mL injection, Inject into the muscle., Disp: , Rfl:     topiramate (TOPAMAX) 50 MG tablet, Take 50  "mg by mouth 2 (two) times daily., Disp: , Rfl:     zolpidem (AMBIEN) 10 mg Tab, Take 10 mg by mouth., Disp: , Rfl:     triamcinolone acetonide 0.025% (KENALOG) 0.025 % Oint, Mix with nystatin and Instill fingertip full to both ears twice a month, Disp: , Rfl:     Review of Systems   Constitutional: Negative for appetite change, chills, diaphoresis, fatigue and fever.   HENT: Positive for hearing loss. Negative for congestion, ear pain, postnasal drip, rhinorrhea, sinus pressure, sore throat and tinnitus.    Eyes: Negative for visual disturbance.   Respiratory: Negative for cough, chest tightness, shortness of breath and wheezing.    Cardiovascular: Negative for chest pain, palpitations and leg swelling.   Gastrointestinal: Negative for abdominal distention, abdominal pain, blood in stool, constipation, diarrhea, nausea and vomiting.   Endocrine: Negative for polydipsia, polyphagia and polyuria.   Genitourinary: Negative for difficulty urinating, dysuria, flank pain, frequency, hematuria and urgency.   Musculoskeletal: Negative for arthralgias and myalgias.   Skin: Positive for rash (Dry flaky skin greatest on legs.  Questionable Topamax side effect.).   Neurological: Positive for dizziness and headaches. Negative for weakness and light-headedness.   Hematological: Negative for adenopathy.   Psychiatric/Behavioral: Negative for sleep disturbance. The patient is not nervous/anxious.      Objective:   /77   Pulse 65   Resp 16   Ht 6' 1" (1.854 m)   Wt 117 kg (257 lb 15 oz)   BMI 34.03 kg/m²     Physical Exam   Constitutional: He is oriented to person, place, and time. Vital signs are normal. He appears well-developed and well-nourished.   Severe obesity   HENT:   Nose: Nose normal. No mucosal edema or rhinorrhea. Right sinus exhibits no maxillary sinus tenderness and no frontal sinus tenderness. Left sinus exhibits no maxillary sinus tenderness and no frontal sinus tenderness.   Mouth/Throat: Uvula is " midline, oropharynx is clear and moist and mucous membranes are normal. No tonsillar exudate.   Clinically hearing devices in place bilaterally.   Neck: No JVD present. No thyroid mass and no thyromegaly present.   Cardiovascular: Normal rate, regular rhythm and normal heart sounds. Exam reveals no gallop and no friction rub.   No murmur heard.  Pulses:       Radial pulses are 2+ on the right side, and 2+ on the left side.   Pulmonary/Chest: Effort normal and breath sounds normal. He has no wheezes. He has no rhonchi. He has no rales.   Abdominal: Soft. He exhibits no distension. There is no tenderness. There is no rebound, no guarding and no CVA tenderness.   Musculoskeletal: He exhibits no edema.   Lymphadenopathy:     He has no cervical adenopathy.   Neurological: He is alert and oriented to person, place, and time.   Skin: Skin is warm and dry. No rash noted.   Psychiatric: He has a normal mood and affect.   Nursing note and vitals reviewed.    CBC with normal white cells, red cells, platelets.    CMP with normal glucose, kidney, liver, electrolytes.    Lipid panel total cholesterol 150, triglycerides 136, HDL 40, LDL 83. Ten year risk 2%.    Urinalysis normal.    A1c, TSH, lead level pending.      Pulmonary function test pending.      Audiology exam not done.      Assessment:       ICD-10-CM ICD-9-CM   1. Routine general medical examination at a health care facility Z00.00 V70.0     Plan:   Blood pressure normal.  BMI 34.  Exam stable.    Full executive Health letter when all test resulted.    Tetanus booster up-to-date  Flu vaccine up-to-date.    Follow-up all specialist and PCP as scheduled  Return clinic 1 year executive health evaluation 4.

## 2019-10-22 NOTE — PROGRESS NOTES
Executive Health Screening    Narendra Watson was seen 10/22/2019 for an [facility] hearing screen.  He is a bilateral cochlear implant user.  He has been using his right cochlear implant for 4 years, and his left for 5 years. He continues care and maintenance at [facility]. He declined audio testing today due to not having any residual hearing without the use of his cochlear implants.

## 2019-10-22 NOTE — PROGRESS NOTES
"Nutrition Assessment  Client name:  Narendra Watson  :  1970  Age:  49 y.o.  Gender:  male    Client states:  Very pleasant employee of [employer] here for his annual physical with [facility]. 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 Reports being  and has a 16 year old daughter. Does not smoke and drinks socially. Wears hearing-aids in both ears as of 4-5 years ago. Reports history of a back surgery, but it does not limit him from doing any physical activities. Patient does not have any set exercise routine currently. Through diet recall patient revealed he typically consumes 3 meals and 1 snack daily. Lunch meals are normally from a fast food restaurant while dinners are home-cooked. Consumes a nightly bowl of ice cream. Very rarely does patient consume fried foods at home. Patient reports knowing diet coke is not the best drink choice, but does not want to change this habit just yet. Patient's chart revealed a 30 lbs weight loss from 2907-7094; patient reports weight loss occurred from making better nutrition choices and has been able to maintain that weight loss through 2019. Patient did not have any nutrition-related questions or concerns at today's appointment.    Anthropometrics  Height:  6' 1"     Weight:  257 lbs  BMI:  34  % Body Fat:  n/a    Clinical Signs/Symptoms  N/V/D:  None  Appetite (Good, Fair, or Poor):  Good      Past Medical History:   Diagnosis Date    Allergic rhinitis     Hypotestosteronism     Insomnia     Migraine     Vertigo        Past Surgical History:   Procedure Laterality Date    BACK SURGERY      INNER EAR SURGERY         Medications    has a current medication list which includes the following prescription(s): celecoxib, cetirizine, meclizine, nystatin, ondansetron, rizatriptan, testosterone cypionate, topiramate, triamcinolone acetonide 0.025%, and zolpidem.    Vitamins, Minerals, and/or Supplements:  MVI "     Food/Medication Interactions:  Reviewed     Food Allergies or Intolerances:  Self-reported milk intolerance     Social History    Marital status:    Employment:  [employer]    Social History     Tobacco Use    Smoking status: Never Smoker    Smokeless tobacco: Never Used   Substance Use Topics    Alcohol use: Yes        Lab Reports   Total Cholesterol:  150    Triglycerides:  136  HDL:  40  LDL:  82.8   Glucose:  103  HbA1c:  pending  BP:  118/77     Food History  Breakfast:  Banana  Mid-morning Snack:  None  Lunch:  Fast Food ([employer], [employer])  Mid-afternoon Snack:  None  Dinner:  Baked chicken + baked vegetables/ Hamburger/ Baked fish  H.S. Snack:  Ice cream  *Fluid intake:  2-3 diet cokes, 2 coffee + equal, water    Exercise History:  None    Cultural/Spiritual/Personal Preferences:  None noted    Support System:  Spouse    State of Change:  Contemplation    Barriers to Change:  None    Diagnosis    Obesity related to physical inactivity and excessive energy intake as evidenced by BMI 34.    Intervention    RMR (Method:  [place]-St. Jeor):  2095 kcal  Activity Factor:  1.3  JESSIE:  2723 - 500 = 2223 calories    Goals:  1.  Aim for 5% (12 lbs) weight loss over the next 3 months.  2.  Begin exercising, aiming for 150 minutes of moderate physical activity weekly.  3.  When consuming fast food, choose healthier options listed in the Fast Food Guide and be mindful of portions.    Nutrition Education  Labs were not available at time of appointment, therefore were not reviewed. In viewing last year's labs, it was shown patient had a below normal HDL. Discussed with patient lifestyle changes (exercise, improved eating habits, weight loss) that are beneficial when trying to improve HDL levels. Provided recommendation of 150 minutes of moderate physical activity throughout the week. Discussed 5% (12 lbs) weight loss in 3 months as a safe amount to lose and to also provide added benefit of reducing heart disease  risk. Provided patient with examples of unsaturated fats to chose more often than saturated and trans fats. Reviewed healthier meal options available at various fast food restaurants. Discussed My Plate method (1/2 plate vegetables, 1/4 plate protein, 1/4 plate starch) to follow to ensure he has a balanced diet. Complimented patient on his weight loss from 6333-2141 and encouraged continued lifestyle modifications to further improve his health status. Provided contact information and encouraged patient to call with any nutrition-related questions or concerns he has once leaving appointment.    Patient verbalized understanding of nutrition education and recommendations received.    Handouts Provided  Meal Planning Guide  Restaurant Guide  Eat Fit Shopping List  Eat Fit Anat  Fast Food Guide  Vitamin/Mineral Guide  Satisfying Salads  My Plate Method    Monitoring/Evaluation    Monitor the following:  Weight  BMI  Caloric intake  Labs:  CMP, Lipid panel, HgbA1c    Follow Up Plan:  Communication with referring healthcare provider is unnecessary at this time as patient presented as part of annual wellness exam.  However, will follow up with patient in 1-2 years.

## 2019-10-24 LAB
CITY: NORMAL
COUNTY: NORMAL
GUARDIAN FIRST NAME: NORMAL
GUARDIAN LAST NAME: NORMAL
LEAD BLDV-MCNC: <1 MCG/DL (ref 0–4.9)
PHONE #: NORMAL
POSTAL CODE: NORMAL
RACE: NORMAL
SPECIMEN SOURCE: NORMAL
STATE OF RESIDENCE: NORMAL
STREET ADDRESS: NORMAL

## 2020-10-28 ENCOUNTER — OFFICE VISIT (OUTPATIENT)
Dept: INTERNAL MEDICINE | Facility: CLINIC | Age: 50
End: 2020-10-28

## 2020-10-28 ENCOUNTER — CLINICAL SUPPORT (OUTPATIENT)
Dept: INTERNAL MEDICINE | Facility: CLINIC | Age: 50
End: 2020-10-28

## 2020-10-28 ENCOUNTER — CLINICAL SUPPORT (OUTPATIENT)
Dept: INTERNAL MEDICINE | Facility: CLINIC | Age: 50
End: 2020-10-28
Payer: COMMERCIAL

## 2020-10-28 VITALS
SYSTOLIC BLOOD PRESSURE: 114 MMHG | RESPIRATION RATE: 16 BRPM | HEIGHT: 73 IN | WEIGHT: 277.75 LBS | HEART RATE: 69 BPM | BODY MASS INDEX: 36.81 KG/M2 | DIASTOLIC BLOOD PRESSURE: 74 MMHG | TEMPERATURE: 96 F

## 2020-10-28 DIAGNOSIS — Z00.00 ROUTINE GENERAL MEDICAL EXAMINATION AT A HEALTH CARE FACILITY: Primary | ICD-10-CM

## 2020-10-28 DIAGNOSIS — Z23 NEED FOR INFLUENZA VACCINATION: ICD-10-CM

## 2020-10-28 DIAGNOSIS — Z71.3 DIETARY COUNSELING: ICD-10-CM

## 2020-10-28 LAB
ALBUMIN SERPL BCP-MCNC: 4 G/DL (ref 3.5–5.2)
ALP SERPL-CCNC: 68 U/L (ref 55–135)
ALT SERPL W/O P-5'-P-CCNC: 15 U/L (ref 10–44)
ANION GAP SERPL CALC-SCNC: 10 MMOL/L (ref 8–16)
AST SERPL-CCNC: 12 U/L (ref 10–40)
BILIRUB SERPL-MCNC: 0.6 MG/DL (ref 0.1–1)
BILIRUB UR QL STRIP: NEGATIVE
BUN SERPL-MCNC: 9 MG/DL (ref 6–20)
CALCIUM SERPL-MCNC: 9.6 MG/DL (ref 8.7–10.5)
CHLORIDE SERPL-SCNC: 107 MMOL/L (ref 95–110)
CHOLEST SERPL-MCNC: 163 MG/DL (ref 120–199)
CHOLEST/HDLC SERPL: 4.2 {RATIO} (ref 2–5)
CLARITY UR: CLEAR
CO2 SERPL-SCNC: 23 MMOL/L (ref 23–29)
COLOR UR: YELLOW
CREAT SERPL-MCNC: 0.8 MG/DL (ref 0.5–1.4)
ERYTHROCYTE [DISTWIDTH] IN BLOOD BY AUTOMATED COUNT: 13.2 % (ref 11.5–14.5)
EST. GFR  (AFRICAN AMERICAN): >60 ML/MIN/1.73 M^2
EST. GFR  (NON AFRICAN AMERICAN): >60 ML/MIN/1.73 M^2
ESTIMATED AVG GLUCOSE: 105 MG/DL (ref 68–131)
GLUCOSE SERPL-MCNC: 117 MG/DL (ref 70–110)
GLUCOSE UR QL STRIP: NEGATIVE
HBA1C MFR BLD HPLC: 5.3 % (ref 4–5.6)
HCT VFR BLD AUTO: 44.8 % (ref 40–54)
HDLC SERPL-MCNC: 39 MG/DL (ref 40–75)
HDLC SERPL: 23.9 % (ref 20–50)
HGB BLD-MCNC: 14.7 G/DL (ref 14–18)
HGB UR QL STRIP: NEGATIVE
KETONES UR QL STRIP: NEGATIVE
LDLC SERPL CALC-MCNC: 103.4 MG/DL (ref 63–159)
LEUKOCYTE ESTERASE UR QL STRIP: NEGATIVE
MCH RBC QN AUTO: 28.8 PG (ref 27–31)
MCHC RBC AUTO-ENTMCNC: 32.8 G/DL (ref 32–36)
MCV RBC AUTO: 88 FL (ref 82–98)
NITRITE UR QL STRIP: NEGATIVE
NONHDLC SERPL-MCNC: 124 MG/DL
PH UR STRIP: 6 [PH] (ref 5–8)
PLATELET # BLD AUTO: 283 K/UL (ref 150–350)
PMV BLD AUTO: 9.6 FL (ref 9.2–12.9)
POTASSIUM SERPL-SCNC: 4.1 MMOL/L (ref 3.5–5.1)
PROT SERPL-MCNC: 6.8 G/DL (ref 6–8.4)
PROT UR QL STRIP: NEGATIVE
RBC # BLD AUTO: 5.11 M/UL (ref 4.6–6.2)
SODIUM SERPL-SCNC: 140 MMOL/L (ref 136–145)
SP GR UR STRIP: >=1.03 (ref 1–1.03)
TRIGL SERPL-MCNC: 103 MG/DL (ref 30–150)
TSH SERPL DL<=0.005 MIU/L-ACNC: 0.59 UIU/ML (ref 0.4–4)
URN SPEC COLLECT METH UR: ABNORMAL
WBC # BLD AUTO: 5.92 K/UL (ref 3.9–12.7)

## 2020-10-28 PROCEDURE — 97802 PR MED NUTR THER, 1ST, INDIV, EA 15 MIN: ICD-10-PCS | Mod: S$GLB,,, | Performed by: INTERNAL MEDICINE

## 2020-10-28 PROCEDURE — 90471 IMMUNIZATION ADMIN: CPT | Mod: S$GLB,,, | Performed by: FAMILY MEDICINE

## 2020-10-28 PROCEDURE — 83036 HEMOGLOBIN GLYCOSYLATED A1C: CPT

## 2020-10-28 PROCEDURE — 99999 PR PBB SHADOW E&M-EST. PATIENT-LVL IV: ICD-10-PCS | Mod: PBBFAC,,, | Performed by: FAMILY MEDICINE

## 2020-10-28 PROCEDURE — 81003 URINALYSIS AUTO W/O SCOPE: CPT

## 2020-10-28 PROCEDURE — 99396 PR PREVENTIVE VISIT,EST,40-64: ICD-10-PCS | Mod: S$GLB,,, | Performed by: FAMILY MEDICINE

## 2020-10-28 PROCEDURE — 90686 FLU VACCINE (QUAD) GREATER THAN OR EQUAL TO 3YO PRESERVATIVE FREE IM: ICD-10-PCS | Mod: S$GLB,,, | Performed by: FAMILY MEDICINE

## 2020-10-28 PROCEDURE — 85027 COMPLETE CBC AUTOMATED: CPT

## 2020-10-28 PROCEDURE — 90686 IIV4 VACC NO PRSV 0.5 ML IM: CPT | Mod: S$GLB,,, | Performed by: FAMILY MEDICINE

## 2020-10-28 PROCEDURE — 80061 LIPID PANEL: CPT

## 2020-10-28 PROCEDURE — 99396 PREV VISIT EST AGE 40-64: CPT | Mod: S$GLB,,, | Performed by: FAMILY MEDICINE

## 2020-10-28 PROCEDURE — 99999 PR PBB SHADOW E&M-EST. PATIENT-LVL IV: CPT | Mod: PBBFAC,,, | Performed by: FAMILY MEDICINE

## 2020-10-28 PROCEDURE — 90471 FLU VACCINE (QUAD) GREATER THAN OR EQUAL TO 3YO PRESERVATIVE FREE IM: ICD-10-PCS | Mod: S$GLB,,, | Performed by: FAMILY MEDICINE

## 2020-10-28 PROCEDURE — 83655 ASSAY OF LEAD: CPT

## 2020-10-28 PROCEDURE — 80053 COMPREHEN METABOLIC PANEL: CPT

## 2020-10-28 PROCEDURE — 97802 MEDICAL NUTRITION INDIV IN: CPT | Mod: S$GLB,,, | Performed by: INTERNAL MEDICINE

## 2020-10-28 PROCEDURE — 84443 ASSAY THYROID STIM HORMONE: CPT

## 2020-10-28 RX ORDER — PROPRANOLOL HYDROCHLORIDE 20 MG/1
20 TABLET ORAL 2 TIMES DAILY
COMMUNITY
Start: 2020-10-23

## 2020-10-28 RX ORDER — UBROGEPANT 100 MG/1
100 TABLET ORAL ONCE AS NEEDED
COMMUNITY

## 2020-10-28 RX ORDER — TIZANIDINE 4 MG/1
TABLET ORAL
COMMUNITY
Start: 2020-08-25 | End: 2023-10-24

## 2020-10-28 RX ORDER — FREMANEZUMAB-VFRM 225 MG/1.5ML
225 INJECTION SUBCUTANEOUS
COMMUNITY

## 2020-10-28 NOTE — PROGRESS NOTES
Subjective:   Patient ID: Narendra Watson is a 50 y.o. male.  Chief Complaint:  Executive Health    Executive health evaluation 4.   PCP Ted Little. Neurologist Dr. Jerome Richardson.    Past medical history for low testosterone, migraine headaches, allergic rhinitis, insomnia, vertigo.    Past surgical history for back surgery and inner ear surgery.    Current medications include Topamax 25 mg twice a day, Maxalt 10 mg daily as needed, Celebrex 200 mg daily, Depo-Testosterone injections, Zyrtec 10 mg daily,  Inderal 20 mg twice a day, Ajovy monthly injections, and Ubrelvy 100 mg daily as needed. nystatin cream as needed, Kenalog cream as needed, Antivert 25 mg as needed, Zofran 4 mg as needed, and Ambien 10 mg as needed.    No known drug allergies  Social history employed by [email or web address]  .  One daughter.  No tobacco illicit drug use.  Social alcohol use.    Family history includes father, , diabetes, coronary artery disease, heart attack.  Mother, , Alzheimer's.  Brother  diabetes and pulmonary embolus.  No known colon or prostate cancer.    Routine health maintenance include tetanus booster 2018 and flu vaccine 2019.    No specific complaints concerns today.    Review of Systems   Constitutional: Negative for appetite change, chills, diaphoresis, fatigue and fever.   HENT: Positive for hearing loss. Negative for congestion, dental problem, ear discharge, ear pain, postnasal drip, rhinorrhea, sinus pressure, sinus pain, sore throat, tinnitus and trouble swallowing.    Eyes: Negative for pain, redness and visual disturbance.   Respiratory: Negative for cough, chest tightness, shortness of breath and wheezing.    Cardiovascular: Negative for chest pain, palpitations and leg swelling.   Gastrointestinal: Negative for abdominal distention, abdominal pain, blood in stool, constipation, diarrhea, nausea and vomiting.   Endocrine: Negative for polydipsia,  "polyphagia and polyuria.   Genitourinary: Negative for difficulty urinating, dysuria, flank pain, frequency, hematuria and urgency.   Musculoskeletal: Negative for arthralgias and myalgias.   Skin: Negative for rash.   Neurological: Positive for headaches. Negative for dizziness, weakness and light-headedness.   Hematological: Negative for adenopathy.   Psychiatric/Behavioral: Negative for sleep disturbance. The patient is not nervous/anxious.      Objective:   /74   Pulse 69   Temp 96.2 °F (35.7 °C) (Tympanic)   Resp 16   Ht 6' 1" (1.854 m)   Wt 126 kg (277 lb 12.5 oz)   BMI 36.65 kg/m²     Physical Exam  Vitals signs and nursing note reviewed.   Constitutional:       Appearance: Normal appearance. He is well-developed. He is obese.   HENT:      Nose: Nose normal. No mucosal edema, congestion or rhinorrhea.      Right Turbinates: Not enlarged or swollen.      Left Turbinates: Not enlarged or swollen.      Right Sinus: No maxillary sinus tenderness or frontal sinus tenderness.      Left Sinus: No maxillary sinus tenderness or frontal sinus tenderness.      Mouth/Throat:      Mouth: No oral lesions.      Pharynx: Oropharynx is clear. Uvula midline.      Tonsils: No tonsillar exudate.   Eyes:      General: No scleral icterus.        Right eye: No discharge.         Left eye: No discharge.      Conjunctiva/sclera: Conjunctivae normal.      Right eye: Right conjunctiva is not injected.      Left eye: Left conjunctiva is not injected.   Neck:      Thyroid: No thyroid mass, thyromegaly or thyroid tenderness.      Vascular: No JVD.   Cardiovascular:      Rate and Rhythm: Normal rate and regular rhythm.      Pulses:           Radial pulses are 2+ on the right side and 2+ on the left side.      Heart sounds: Normal heart sounds. No murmur. No friction rub. No gallop.    Pulmonary:      Effort: Pulmonary effort is normal. No accessory muscle usage or respiratory distress.      Breath sounds: Normal breath sounds. " No wheezing, rhonchi or rales.   Abdominal:      General: There is no distension.      Palpations: Abdomen is soft.      Tenderness: There is no abdominal tenderness. There is no guarding or rebound.   Musculoskeletal:      Right lower leg: No edema.      Left lower leg: No edema.   Lymphadenopathy:      Cervical: No cervical adenopathy.   Skin:     General: Skin is warm and dry.      Findings: No rash.   Neurological:      Mental Status: He is alert and oriented to person, place, and time.      Coordination: Coordination normal.      Gait: Gait normal.   Psychiatric:         Attention and Perception: Attention and perception normal.         Mood and Affect: Mood normal.         Speech: Speech normal.         Behavior: Behavior normal.         Thought Content: Thought content normal.         Cognition and Memory: Cognition and memory normal.         Judgment: Judgment normal.       CBC with normal white cell count, red cell count, platelet level.    CMP with elevated glucose 117, otherwise normal   Total cholesterol 163.  Triglycerides 103. HDL 39. . Ten year risk proximally 2%.    Urinalysis normal except specific gravity  1.030   A1c pending  TSH pending  Lead pending     Assessment:       ICD-10-CM ICD-9-CM   1. Routine general medical examination at a health care facility  Z00.00 V70.0   2. Need for influenza vaccination  Z23 V04.81     Plan:   Routine general medical examination at a health care facility  Need for influenza vaccination  -     Influenza - Quadrivalent (PF)    Blood pressure normal.  BMI 37.  Remainder exam stable.    Full executive Health letter when all test resulted  Tetanus booster up-to-date  Flu vaccine today  One turns 51 will need to have colonoscopy and shingles vaccine  Otherwise continue all current medications  Follow-up PCP and all specialist scheduled   Return to clinic executive Health evaluation 1 year

## 2020-10-28 NOTE — PROGRESS NOTES
"Nutrition Assessment  Client name:  Narendra Watson  :  1970  Age:  50 y.o.  Gender:  male    Client states:  Very pleasant employee from [employer] here for an annual wellness exam with [employer]. Reports recently starting a low carbohydrate diet along with his wife in attempts to lose weight. Began diet approximately 5 weeks ago and has since lost 16 lbs. Food and exercise history provided below. Reports being full and satisfied with current food choices. Plans to continue current diet until goal weight of 230 lbs is reached.    Anthropometrics  Height:  6' 1"     Weight:  277 lbs  BMI:  36.65  % Body Fat:  n/a    Clinical Signs/Symptoms  N/V/D:  none  Appetite:  good       Past Medical History:   Diagnosis Date    Allergic rhinitis     Hypotestosteronism     Insomnia     Migraine     Vertigo        Past Surgical History:   Procedure Laterality Date    BACK SURGERY      INNER EAR SURGERY         Medications    has a current medication list which includes the following prescription(s): celecoxib, cetirizine, meclizine, nystatin, ondansetron, rizatriptan, testosterone cypionate, topiramate, triamcinolone acetonide 0.025%, and zolpidem.    Vitamins, Minerals, and/or Supplements:  Buttterbur (for migraines)     Food/Medication Interactions:  Reviewed     Food Allergies or Intolerances:  NKFA     Social History    Marital status:    Employment:  [employer]    Social History     Tobacco Use    Smoking status: Never Smoker    Smokeless tobacco: Never Used   Substance Use Topics    Alcohol use: Yes        Lab Reports   Total Cholesterol:  163    Triglycerides:  103  HDL:  39  LDL:  103.4   Glucose:  117  HbA1c:  pending  BP Readings from Last 1 Encounters:   10/22/19 118/77       Food History  Reports eating little fried foods. Consumes 1 fruit and fruit juice daily at breakfast. [employer] almonds for a mid-day snack. Often has a grilled/baked meat for dinner with a side of " vegetables. When going to "[employer] will often get a wrap, grilled nuggets, or salad. At nighttime enjoys some type of sugar-free dessert: jello, [place]  *Fluid intake:  Water (little), juice in morning, coffee, diet coke    Exercise History:  None outside of physical requirements of job    Cultural/Spiritual/Personal Preferences:  None identified    Support System:  spouse    State of Change:  Contemplation    Barriers to Change:  none    Diagnosis    Other: Obesity related to excess energy intake as evidenced by BMI 36.    Intervention    RMR (Method:  [place]. Jeor):  2181 kcal  Activity Factor:  1.3    JESSIE:  2835 - 500 = 2335 kcal    Goals:  1.  Aim to take a daily multivitamin.  2.  Aim to consume lean proteins on most days throughout the week in proper portion size of 1/4 plate serving size at each meal.      Nutrition Education  Lipid panel and glucose were not available at time of nutrition consultation, therefore could not be reviewed with patient. Encouraged patient to contact me if lab values are abnormal and we can discuss ways to see improvement. Discussed importance of carbohydrates in daily diet and how weight loss can be successful with inclusion of all food groups in proper portion sizes. If patient continues to choose to follow current diet plan, encouraged daily multivitamin, monitoring lipid panel, and including lean proteins on most days throughout the week. Reminded patient of balanced eating plan discussed last year. Provided contact information.    Patient verbalized understanding of nutrition education and recommendations received.    Handouts Provided  Meal Planning Guide  Restaurant Guide  Eat Fit Shopping List  Eat Fit Anat  Fast Food Guide  My Plate Method    Monitoring/Evaluation    Monitor the following:  Weight  BMI  Caloric intake  Labs:  Lipid Panel    Follow Up Plan:  Communication with referring healthcare provider is unnecessary at this time as patient presented as  part of annual wellness exam.  However, will follow up with patient in 1-2 years.

## 2020-10-30 LAB
LEAD BLD-MCNC: <1 MCG/DL
SPECIMEN SOURCE: NORMAL
STATE OF RESIDENCE: NORMAL

## 2021-10-18 ENCOUNTER — CLINICAL SUPPORT (OUTPATIENT)
Dept: INTERNAL MEDICINE | Facility: CLINIC | Age: 51
End: 2021-10-18
Payer: COMMERCIAL

## 2021-10-18 ENCOUNTER — CLINICAL SUPPORT (OUTPATIENT)
Dept: INTERNAL MEDICINE | Facility: CLINIC | Age: 51
End: 2021-10-18

## 2021-10-18 ENCOUNTER — OFFICE VISIT (OUTPATIENT)
Dept: INTERNAL MEDICINE | Facility: CLINIC | Age: 51
End: 2021-10-18
Payer: COMMERCIAL

## 2021-10-18 VITALS
SYSTOLIC BLOOD PRESSURE: 129 MMHG | DIASTOLIC BLOOD PRESSURE: 80 MMHG | HEART RATE: 61 BPM | BODY MASS INDEX: 37.69 KG/M2 | RESPIRATION RATE: 16 BRPM | TEMPERATURE: 97 F | WEIGHT: 284.38 LBS | HEIGHT: 73 IN

## 2021-10-18 DIAGNOSIS — H83.2X3 VESTIBULAR HYPOFUNCTION OF BOTH EARS: ICD-10-CM

## 2021-10-18 DIAGNOSIS — Z96.21 COCHLEAR IMPLANT FOLLOW-UP: ICD-10-CM

## 2021-10-18 DIAGNOSIS — E34.9 HYPOTESTOSTERONEMIA: ICD-10-CM

## 2021-10-18 DIAGNOSIS — G43.809 MIGRAINE VARIANT WITH HEADACHE: ICD-10-CM

## 2021-10-18 DIAGNOSIS — Z48.89 COCHLEAR IMPLANT FOLLOW-UP: ICD-10-CM

## 2021-10-18 DIAGNOSIS — E66.01 SEVERE OBESITY (BMI 35.0-39.9) WITH COMORBIDITY: ICD-10-CM

## 2021-10-18 DIAGNOSIS — Z00.00 ROUTINE GENERAL MEDICAL EXAMINATION AT A HEALTH CARE FACILITY: Primary | ICD-10-CM

## 2021-10-18 DIAGNOSIS — Z71.3 DIETARY COUNSELING: ICD-10-CM

## 2021-10-18 PROBLEM — R51.9 HEADACHE: Status: RESOLVED | Noted: 2019-10-22 | Resolved: 2021-10-18

## 2021-10-18 LAB
ALBUMIN SERPL BCP-MCNC: 3.9 G/DL (ref 3.5–5.2)
ALP SERPL-CCNC: 81 U/L (ref 55–135)
ALT SERPL W/O P-5'-P-CCNC: 18 U/L (ref 10–44)
ANION GAP SERPL CALC-SCNC: 10 MMOL/L (ref 8–16)
AST SERPL-CCNC: 15 U/L (ref 10–40)
BILIRUB SERPL-MCNC: 0.7 MG/DL (ref 0.1–1)
BILIRUB UR QL STRIP: NEGATIVE
BUN SERPL-MCNC: 8 MG/DL (ref 6–20)
CALCIUM SERPL-MCNC: 9.4 MG/DL (ref 8.7–10.5)
CHLORIDE SERPL-SCNC: 106 MMOL/L (ref 95–110)
CHOLEST SERPL-MCNC: 171 MG/DL (ref 120–199)
CHOLEST/HDLC SERPL: 4.3 {RATIO} (ref 2–5)
CLARITY UR: CLEAR
CO2 SERPL-SCNC: 25 MMOL/L (ref 23–29)
COLOR UR: YELLOW
COMPLEXED PSA SERPL-MCNC: 1.2 NG/ML (ref 0–4)
CREAT SERPL-MCNC: 0.9 MG/DL (ref 0.5–1.4)
ERYTHROCYTE [DISTWIDTH] IN BLOOD BY AUTOMATED COUNT: 13.3 % (ref 11.5–14.5)
EST. GFR  (AFRICAN AMERICAN): >60 ML/MIN/1.73 M^2
EST. GFR  (NON AFRICAN AMERICAN): >60 ML/MIN/1.73 M^2
ESTIMATED AVG GLUCOSE: 111 MG/DL (ref 68–131)
GLUCOSE SERPL-MCNC: 109 MG/DL (ref 70–110)
GLUCOSE UR QL STRIP: NEGATIVE
HBA1C MFR BLD: 5.5 % (ref 4–5.6)
HCT VFR BLD AUTO: 46.2 % (ref 40–54)
HDLC SERPL-MCNC: 40 MG/DL (ref 40–75)
HDLC SERPL: 23.4 % (ref 20–50)
HGB BLD-MCNC: 15.1 G/DL (ref 14–18)
HGB UR QL STRIP: NEGATIVE
KETONES UR QL STRIP: NEGATIVE
LDLC SERPL CALC-MCNC: 106.8 MG/DL (ref 63–159)
LEUKOCYTE ESTERASE UR QL STRIP: NEGATIVE
MCH RBC QN AUTO: 28.2 PG (ref 27–31)
MCHC RBC AUTO-ENTMCNC: 32.7 G/DL (ref 32–36)
MCV RBC AUTO: 86 FL (ref 82–98)
NITRITE UR QL STRIP: NEGATIVE
NONHDLC SERPL-MCNC: 131 MG/DL
PH UR STRIP: 7 [PH] (ref 5–8)
PLATELET # BLD AUTO: 231 K/UL (ref 150–450)
PMV BLD AUTO: 10.7 FL (ref 9.2–12.9)
POTASSIUM SERPL-SCNC: 4.6 MMOL/L (ref 3.5–5.1)
PROT SERPL-MCNC: 7.2 G/DL (ref 6–8.4)
PROT UR QL STRIP: NEGATIVE
RBC # BLD AUTO: 5.36 M/UL (ref 4.6–6.2)
SODIUM SERPL-SCNC: 141 MMOL/L (ref 136–145)
SP GR UR STRIP: 1.02 (ref 1–1.03)
TRIGL SERPL-MCNC: 121 MG/DL (ref 30–150)
TSH SERPL DL<=0.005 MIU/L-ACNC: 0.42 UIU/ML (ref 0.4–4)
URN SPEC COLLECT METH UR: NORMAL
WBC # BLD AUTO: 7.25 K/UL (ref 3.9–12.7)

## 2021-10-18 PROCEDURE — 80053 COMPREHEN METABOLIC PANEL: CPT | Performed by: FAMILY MEDICINE

## 2021-10-18 PROCEDURE — 80061 LIPID PANEL: CPT | Performed by: FAMILY MEDICINE

## 2021-10-18 PROCEDURE — 81003 URINALYSIS AUTO W/O SCOPE: CPT | Performed by: FAMILY MEDICINE

## 2021-10-18 PROCEDURE — 99396 PREV VISIT EST AGE 40-64: CPT | Mod: 25,S$GLB,, | Performed by: FAMILY MEDICINE

## 2021-10-18 PROCEDURE — 90686 FLU VACCINE (QUAD) GREATER THAN OR EQUAL TO 3YO PRESERVATIVE FREE IM: ICD-10-PCS | Mod: ,,, | Performed by: FAMILY MEDICINE

## 2021-10-18 PROCEDURE — 99999 PR PBB SHADOW E&M-EST. PATIENT-LVL IV: ICD-10-PCS | Mod: PBBFAC,,, | Performed by: FAMILY MEDICINE

## 2021-10-18 PROCEDURE — 85027 COMPLETE CBC AUTOMATED: CPT | Performed by: FAMILY MEDICINE

## 2021-10-18 PROCEDURE — 84153 ASSAY OF PSA TOTAL: CPT | Performed by: FAMILY MEDICINE

## 2021-10-18 PROCEDURE — 90471 IMMUNIZATION ADMIN: CPT | Mod: ,,, | Performed by: FAMILY MEDICINE

## 2021-10-18 PROCEDURE — 99999 PR PBB SHADOW E&M-EST. PATIENT-LVL IV: CPT | Mod: PBBFAC,,, | Performed by: FAMILY MEDICINE

## 2021-10-18 PROCEDURE — 97802 MEDICAL NUTRITION INDIV IN: CPT | Mod: S$GLB,,, | Performed by: INTERNAL MEDICINE

## 2021-10-18 PROCEDURE — 99396 PR PREVENTIVE VISIT,EST,40-64: ICD-10-PCS | Mod: 25,S$GLB,, | Performed by: FAMILY MEDICINE

## 2021-10-18 PROCEDURE — 90686 IIV4 VACC NO PRSV 0.5 ML IM: CPT | Mod: ,,, | Performed by: FAMILY MEDICINE

## 2021-10-18 PROCEDURE — 84443 ASSAY THYROID STIM HORMONE: CPT | Performed by: FAMILY MEDICINE

## 2021-10-18 PROCEDURE — 90471 FLU VACCINE (QUAD) GREATER THAN OR EQUAL TO 3YO PRESERVATIVE FREE IM: ICD-10-PCS | Mod: ,,, | Performed by: FAMILY MEDICINE

## 2021-10-18 PROCEDURE — 97802 PR MED NUTR THER, 1ST, INDIV, EA 15 MIN: ICD-10-PCS | Mod: S$GLB,,, | Performed by: INTERNAL MEDICINE

## 2021-10-18 PROCEDURE — 83036 HEMOGLOBIN GLYCOSYLATED A1C: CPT | Performed by: FAMILY MEDICINE

## 2021-10-18 PROCEDURE — 83655 ASSAY OF LEAD: CPT | Performed by: FAMILY MEDICINE

## 2021-10-18 RX ORDER — ACETAZOLAMIDE 250 MG/1
250 TABLET ORAL NIGHTLY
COMMUNITY
Start: 2021-09-29 | End: 2021-10-18

## 2021-10-18 RX ORDER — CLONAZEPAM 0.5 MG/1
0.25 TABLET ORAL NIGHTLY
COMMUNITY
Start: 2021-08-04 | End: 2023-10-24

## 2021-10-18 RX ORDER — BACLOFEN 10 MG/1
TABLET ORAL
COMMUNITY
Start: 2021-09-13 | End: 2023-10-24

## 2021-10-18 RX ORDER — METHAZOLAMIDE 25 MG/1
25 TABLET ORAL NIGHTLY
COMMUNITY
Start: 2021-10-07

## 2021-10-18 RX ORDER — BETAHISTINE HCL 100 %
POWDER (GRAM) MISCELLANEOUS
COMMUNITY
Start: 2021-08-04

## 2021-10-19 LAB
LEAD BLD-MCNC: <1 MCG/DL
SPECIMEN SOURCE: NORMAL
STATE OF RESIDENCE: NORMAL

## 2022-10-20 ENCOUNTER — OFFICE VISIT (OUTPATIENT)
Dept: INTERNAL MEDICINE | Facility: CLINIC | Age: 52
End: 2022-10-20

## 2022-10-20 ENCOUNTER — CLINICAL SUPPORT (OUTPATIENT)
Dept: INTERNAL MEDICINE | Facility: CLINIC | Age: 52
End: 2022-10-20

## 2022-10-20 ENCOUNTER — CLINICAL SUPPORT (OUTPATIENT)
Dept: INTERNAL MEDICINE | Facility: CLINIC | Age: 52
End: 2022-10-20
Payer: COMMERCIAL

## 2022-10-20 VITALS
TEMPERATURE: 98 F | HEIGHT: 73 IN | BODY MASS INDEX: 37.4 KG/M2 | SYSTOLIC BLOOD PRESSURE: 130 MMHG | HEART RATE: 68 BPM | DIASTOLIC BLOOD PRESSURE: 79 MMHG | WEIGHT: 282.19 LBS

## 2022-10-20 DIAGNOSIS — Z23 NEED FOR INFLUENZA VACCINATION: ICD-10-CM

## 2022-10-20 DIAGNOSIS — Z71.3 DIETARY COUNSELING: Primary | ICD-10-CM

## 2022-10-20 DIAGNOSIS — Z00.00 ROUTINE GENERAL MEDICAL EXAMINATION AT A HEALTH CARE FACILITY: Primary | ICD-10-CM

## 2022-10-20 LAB
ALBUMIN SERPL BCP-MCNC: 3.8 G/DL (ref 3.5–5.2)
ALP SERPL-CCNC: 76 U/L (ref 55–135)
ALT SERPL W/O P-5'-P-CCNC: 11 U/L (ref 10–44)
ANION GAP SERPL CALC-SCNC: 10 MMOL/L (ref 8–16)
AST SERPL-CCNC: 17 U/L (ref 10–40)
BILIRUB SERPL-MCNC: 0.5 MG/DL (ref 0.1–1)
BILIRUB UR QL STRIP: NEGATIVE
BUN SERPL-MCNC: 9 MG/DL (ref 6–20)
CALCIUM SERPL-MCNC: 9.5 MG/DL (ref 8.7–10.5)
CHLORIDE SERPL-SCNC: 109 MMOL/L (ref 95–110)
CHOLEST SERPL-MCNC: 166 MG/DL (ref 120–199)
CHOLEST/HDLC SERPL: 4.5 {RATIO} (ref 2–5)
CLARITY UR: CLEAR
CO2 SERPL-SCNC: 23 MMOL/L (ref 23–29)
COLOR UR: YELLOW
COMPLEXED PSA SERPL-MCNC: 0.99 NG/ML (ref 0–4)
CREAT SERPL-MCNC: 0.8 MG/DL (ref 0.5–1.4)
ERYTHROCYTE [DISTWIDTH] IN BLOOD BY AUTOMATED COUNT: 13.6 % (ref 11.5–14.5)
EST. GFR  (NO RACE VARIABLE): >60 ML/MIN/1.73 M^2
ESTIMATED AVG GLUCOSE: 108 MG/DL (ref 68–131)
GLUCOSE SERPL-MCNC: 105 MG/DL (ref 70–110)
GLUCOSE UR QL STRIP: NEGATIVE
HBA1C MFR BLD: 5.4 % (ref 4–5.6)
HCT VFR BLD AUTO: 43.8 % (ref 40–54)
HDLC SERPL-MCNC: 37 MG/DL (ref 40–75)
HDLC SERPL: 22.3 % (ref 20–50)
HGB BLD-MCNC: 14.3 G/DL (ref 14–18)
HGB UR QL STRIP: NEGATIVE
KETONES UR QL STRIP: NEGATIVE
LDLC SERPL CALC-MCNC: 104.4 MG/DL (ref 63–159)
LEUKOCYTE ESTERASE UR QL STRIP: NEGATIVE
MCH RBC QN AUTO: 28.1 PG (ref 27–31)
MCHC RBC AUTO-ENTMCNC: 32.6 G/DL (ref 32–36)
MCV RBC AUTO: 86 FL (ref 82–98)
NITRITE UR QL STRIP: NEGATIVE
NONHDLC SERPL-MCNC: 129 MG/DL
PH UR STRIP: 6 [PH] (ref 5–8)
PLATELET # BLD AUTO: 256 K/UL (ref 150–450)
PMV BLD AUTO: 9.4 FL (ref 9.2–12.9)
POTASSIUM SERPL-SCNC: 4.3 MMOL/L (ref 3.5–5.1)
PROT SERPL-MCNC: 6.5 G/DL (ref 6–8.4)
PROT UR QL STRIP: NEGATIVE
RBC # BLD AUTO: 5.08 M/UL (ref 4.6–6.2)
SODIUM SERPL-SCNC: 142 MMOL/L (ref 136–145)
SP GR UR STRIP: 1.02 (ref 1–1.03)
TRIGL SERPL-MCNC: 123 MG/DL (ref 30–150)
TSH SERPL DL<=0.005 MIU/L-ACNC: 0.57 UIU/ML (ref 0.4–4)
URN SPEC COLLECT METH UR: NORMAL
WBC # BLD AUTO: 6.17 K/UL (ref 3.9–12.7)

## 2022-10-20 PROCEDURE — 80053 COMPREHEN METABOLIC PANEL: CPT | Performed by: FAMILY MEDICINE

## 2022-10-20 PROCEDURE — 97802 MEDICAL NUTRITION INDIV IN: CPT | Mod: S$GLB,,, | Performed by: INTERNAL MEDICINE

## 2022-10-20 PROCEDURE — 83036 HEMOGLOBIN GLYCOSYLATED A1C: CPT | Performed by: FAMILY MEDICINE

## 2022-10-20 PROCEDURE — 97802 PR MED NUTR THER, 1ST, INDIV, EA 15 MIN: ICD-10-PCS | Mod: S$GLB,,, | Performed by: INTERNAL MEDICINE

## 2022-10-20 PROCEDURE — 99396 PR PREVENTIVE VISIT,EST,40-64: ICD-10-PCS | Mod: S$GLB,,, | Performed by: FAMILY MEDICINE

## 2022-10-20 PROCEDURE — 99999 PR PBB SHADOW E&M-EST. PATIENT-LVL V: ICD-10-PCS | Mod: PBBFAC,,, | Performed by: FAMILY MEDICINE

## 2022-10-20 PROCEDURE — 85027 COMPLETE CBC AUTOMATED: CPT | Performed by: FAMILY MEDICINE

## 2022-10-20 PROCEDURE — 84443 ASSAY THYROID STIM HORMONE: CPT | Performed by: FAMILY MEDICINE

## 2022-10-20 PROCEDURE — 84153 ASSAY OF PSA TOTAL: CPT | Performed by: FAMILY MEDICINE

## 2022-10-20 PROCEDURE — 80061 LIPID PANEL: CPT | Performed by: FAMILY MEDICINE

## 2022-10-20 PROCEDURE — 99396 PREV VISIT EST AGE 40-64: CPT | Mod: S$GLB,,, | Performed by: FAMILY MEDICINE

## 2022-10-20 PROCEDURE — 81003 URINALYSIS AUTO W/O SCOPE: CPT | Performed by: FAMILY MEDICINE

## 2022-10-20 PROCEDURE — 83655 ASSAY OF LEAD: CPT | Performed by: FAMILY MEDICINE

## 2022-10-20 PROCEDURE — 90686 IIV4 VACC NO PRSV 0.5 ML IM: CPT | Mod: S$GLB,,, | Performed by: FAMILY MEDICINE

## 2022-10-20 PROCEDURE — 90471 FLU VACCINE (QUAD) GREATER THAN OR EQUAL TO 3YO PRESERVATIVE FREE IM: ICD-10-PCS | Mod: S$GLB,,, | Performed by: FAMILY MEDICINE

## 2022-10-20 PROCEDURE — 90686 FLU VACCINE (QUAD) GREATER THAN OR EQUAL TO 3YO PRESERVATIVE FREE IM: ICD-10-PCS | Mod: S$GLB,,, | Performed by: FAMILY MEDICINE

## 2022-10-20 PROCEDURE — 99999 PR PBB SHADOW E&M-EST. PATIENT-LVL V: CPT | Mod: PBBFAC,,, | Performed by: FAMILY MEDICINE

## 2022-10-20 PROCEDURE — 90471 IMMUNIZATION ADMIN: CPT | Mod: S$GLB,,, | Performed by: FAMILY MEDICINE

## 2022-10-20 RX ORDER — MONTELUKAST SODIUM 10 MG/1
TABLET ORAL
COMMUNITY
Start: 2022-10-03

## 2022-10-20 NOTE — PROGRESS NOTES
Subjective:   Patient ID: Narendra Watson is a 52 y.o. male.  Chief Complaint:  Executive Health    Patient presents for executive health    Executive health evaluation #7  PCP Ted Little. Neurologist Dr. Jerome Richardson.    Past medical history for low testosterone, migraine headaches, allergic rhinitis, insomnia, vertigo, and colon polyps    Past surgical history for back surgery, inner ear surgery, and a colonoscopy with polypectomy   Current medications include Maxalt 10 mg daily as needed, Celebrex 200 mg daily, Depo-Testosterone injections, Zyrtec 10 mg daily, Inderal 20 mg twice a day, Ajovy monthly injections, and Ubrelvy 100 mg daily as needed. nystatin cream as needed, Kenalog cream as needed, Antivert 25 mg as needed, Zofran 4 mg as needed, and Ambien 10 mg daily.    No known drug allergies  Social history employed by [employer].  .  One daughter.  No tobacco illicit drug use.  Social alcohol use.    Family history includes father, , diabetes, coronary artery disease, heart attack.  Mother, , Alzheimer's.  Brother  diabetes and pulmonary embolus.  No known colon or prostate cancer.    Routine health maintenance with a tetanus booster in 2018, a flu vaccine in 2021, a completed primary COVID vaccine series but no booster, no previous shingles vaccines, and reported colonoscopy with polypectomy in .   No specific complaints concerns today.    Review of Systems   Constitutional:  Negative for appetite change, chills, diaphoresis, fatigue and fever.   HENT:  Positive for hearing loss. Negative for congestion, dental problem, ear discharge, ear pain, postnasal drip, rhinorrhea, sinus pressure, sinus pain, sore throat, tinnitus and trouble swallowing.    Eyes:  Negative for pain, redness and visual disturbance.   Respiratory:  Negative for cough, chest tightness, shortness of breath and wheezing.    Cardiovascular:  Negative for chest pain,  "palpitations and leg swelling.   Gastrointestinal:  Negative for abdominal distention, abdominal pain, blood in stool, constipation, diarrhea, nausea and vomiting.   Endocrine: Negative for polydipsia, polyphagia and polyuria.   Genitourinary:  Negative for difficulty urinating, dysuria, flank pain, frequency, hematuria and urgency.   Musculoskeletal:  Negative for arthralgias and myalgias.   Skin:  Negative for rash.   Neurological:  Positive for headaches. Negative for dizziness, weakness and light-headedness.   Hematological:  Negative for adenopathy.   Psychiatric/Behavioral:  Negative for sleep disturbance. The patient is not nervous/anxious.      Objective:   /79   Pulse 68   Temp 97.8 °F (36.6 °C) (Tympanic)   Ht 6' 1" (1.854 m)   Wt 128 kg (282 lb 3 oz)   BMI 37.23 kg/m²     Physical Exam  Vitals and nursing note reviewed.   Constitutional:       Appearance: Normal appearance. He is well-developed. He is obese.   HENT:      Nose: Nose normal. No mucosal edema, congestion or rhinorrhea.      Right Turbinates: Not enlarged or swollen.      Left Turbinates: Not enlarged or swollen.      Right Sinus: No maxillary sinus tenderness or frontal sinus tenderness.      Left Sinus: No maxillary sinus tenderness or frontal sinus tenderness.      Mouth/Throat:      Mouth: No oral lesions.      Pharynx: Oropharynx is clear. Uvula midline.      Tonsils: No tonsillar exudate.   Eyes:      General: No scleral icterus.        Right eye: No discharge.         Left eye: No discharge.      Conjunctiva/sclera: Conjunctivae normal.      Right eye: Right conjunctiva is not injected.      Left eye: Left conjunctiva is not injected.   Neck:      Thyroid: No thyroid mass, thyromegaly or thyroid tenderness.      Vascular: No JVD.   Cardiovascular:      Rate and Rhythm: Normal rate and regular rhythm.      Pulses:           Radial pulses are 2+ on the right side and 2+ on the left side.      Heart sounds: Normal heart sounds. " No murmur heard.    No friction rub. No gallop.   Pulmonary:      Effort: Pulmonary effort is normal. No accessory muscle usage or respiratory distress.      Breath sounds: Normal breath sounds. No wheezing, rhonchi or rales.   Abdominal:      General: There is no distension.      Palpations: Abdomen is soft.      Tenderness: There is no abdominal tenderness. There is no guarding or rebound.   Musculoskeletal:      Right lower leg: No edema.      Left lower leg: No edema.   Lymphadenopathy:      Cervical: No cervical adenopathy.   Skin:     General: Skin is warm and dry.      Findings: No rash.   Neurological:      Mental Status: He is alert and oriented to person, place, and time.      Coordination: Coordination normal.      Gait: Gait normal.   Psychiatric:         Attention and Perception: Attention and perception normal.         Mood and Affect: Mood normal.         Speech: Speech normal.         Behavior: Behavior normal.         Thought Content: Thought content normal.         Cognition and Memory: Cognition and memory normal.         Judgment: Judgment normal.     CBC with normal white blood cell count, red blood cell count, and platelet levels.  Sugar, Kidney, Liver, and Electrolyte tests are all normal.  Cholesterol tests are normal. 10-year risk of a heart disease or stroke is 4%  Urinalysis is normal.  A1c, TSH, PSA, lead level pending.    Assessment:       ICD-10-CM ICD-9-CM   1. Routine general medical examination at a health care facility  Z00.00 V70.0   2. Need for influenza vaccination  Z23 V04.81     Plan:   Routine general medical examination at a health care facility  Need for influenza vaccination  -     Influenza - Quadrivalent (PF)    Blood pressure normal.  BMI 37.  Overall exam stable.    Send full executive Health letter when all test resulted.    Reported colon cancer screening up-to-date  Prostate cancer screening today  Tetanus booster up-to-date   Recommend shingles vaccine and COVID  booster through pharmacy  Flu vaccine today  Follow-up with PCP and/or any specialist as scheduled  Return to clinic 1 year for executive Health evaluation or sooner if needed

## 2022-10-20 NOTE — PROGRESS NOTES
"Nutrition Assessment  Session Time:  30 minutes      Client name:  Narendra Watson  :  1970  Age:  52 y.o.  Gender:  male    Client states:  Very pleasant employee from [employer] present for annual Executive Health physical. Last seen for nutrition 2021. 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 He shares that he has struggled with migraine headaches in the last few years, but recently well controlled with medications. Diet pattern is typically 3 meals per day, frequent fast food and ice cream at night. No exercise regimen at this time, occasionally walks the dog with his wife. No nutrition-related questions or concerns today.    Anthropometrics  Height:  6'1"     Weight:  282 lb  BMI:  37.23  % Body Fat:  NA    Clinical Signs/Symptoms  N/V/D:  None  Appetite:  good       Past Medical History:   Diagnosis Date    Allergic rhinitis     Hypotestosteronism     Insomnia     Migraine     Vertigo        Past Surgical History:   Procedure Laterality Date    BACK SURGERY      INNER EAR SURGERY         Medications    has a current medication list which includes the following prescription(s): baclofen, betahistine (bulk), celecoxib, cetirizine, clonazepam, ajovy autoinjector, meclizine, methazolamide, montelukast, ondansetron, propranolol, rizatriptan, testosterone cypionate, tizanidine, ubrelvy, and zolpidem.    Vitamins, Minerals, and/or Supplements:  No     Food/Medication Interactions:  Reviewed     Food Allergies or Intolerances:  NKFA     Social History    Marital status:    Employment:  [employer]    Social History     Tobacco Use    Smoking status: Never    Smokeless tobacco: Never   Substance Use Topics    Alcohol use: Yes        Lab Reports   Sodium   Date Value Ref Range Status   10/20/2022 142 136 - 145 mmol/L Final     Potassium   Date Value Ref Range Status   10/20/2022 4.3 3.5 - 5.1 mmol/L Final     Chloride   Date Value Ref Range " Status   10/20/2022 109 95 - 110 mmol/L Final     CO2   Date Value Ref Range Status   10/20/2022 23 23 - 29 mmol/L Final     Glucose   Date Value Ref Range Status   10/20/2022 105 70 - 110 mg/dL Final     BUN   Date Value Ref Range Status   10/20/2022 9 6 - 20 mg/dL Final     Creatinine   Date Value Ref Range Status   10/20/2022 0.8 0.5 - 1.4 mg/dL Final     Calcium   Date Value Ref Range Status   10/20/2022 9.5 8.7 - 10.5 mg/dL Final     Total Protein   Date Value Ref Range Status   10/20/2022 6.5 6.0 - 8.4 g/dL Final     Albumin   Date Value Ref Range Status   10/20/2022 3.8 3.5 - 5.2 g/dL Final     Total Bilirubin   Date Value Ref Range Status   10/20/2022 0.5 0.1 - 1.0 mg/dL Final     Comment:     For infants and newborns, interpretation of results should be based  on gestational age, weight and in agreement with clinical  observations.    Premature Infant recommended reference ranges:  Up to 24 hours.............<8.0 mg/dL  Up to 48 hours............<12.0 mg/dL  3-5 days..................<15.0 mg/dL  6-29 days.................<15.0 mg/dL       Alkaline Phosphatase   Date Value Ref Range Status   10/20/2022 76 55 - 135 U/L Final     AST   Date Value Ref Range Status   10/20/2022 17 10 - 40 U/L Final     ALT   Date Value Ref Range Status   10/20/2022 11 10 - 44 U/L Final     Anion Gap   Date Value Ref Range Status   10/20/2022 10 8 - 16 mmol/L Final     eGFR if    Date Value Ref Range Status   10/18/2021 >60 >60 mL/min/1.73 m^2 Final     eGFR if non    Date Value Ref Range Status   10/18/2021 >60 >60 mL/min/1.73 m^2 Final     Comment:     Calculation used to obtain the estimated glomerular filtration  rate (eGFR) is the CKD-EPI equation.         Lab Results   Component Value Date    WBC 6.17 10/20/2022    HGB 14.3 10/20/2022    HCT 43.8 10/20/2022    MCV 86 10/20/2022     10/20/2022        Lab Results   Component Value Date    CHOL 166 10/20/2022     Lab Results   Component  Value Date    HDL 37 (L) 10/20/2022     Lab Results   Component Value Date    LDLCALC 104.4 10/20/2022     Lab Results   Component Value Date    TRIG 123 10/20/2022     Lab Results   Component Value Date    CHOLHDL 22.3 10/20/2022     Lab Results   Component Value Date    HGBA1C 5.5 10/18/2021     BP Readings from Last 1 Encounters:   10/18/21 129/80       Food History  Breakfast:  Coffee with a Pop tart or biscuit  Mid-morning Snack:  None  Lunch:  Fast food  Mid-afternoon Snack:  None  Dinner:  Chicken with vegetables  Snack:  Ice Cream      Exercise History:  No current exercise routine    Cultural/Spiritual/Personal Preferences:  None identified    Support System:  spouse    State of Change:  Precontemplation    Barriers to Change:  Time, possible lack of interest    Diagnosis    Overweight related to excessive energy intake and inadequate physical activity as evidenced by patient-reported food and exercise history and BMI 37.    Intervention    RMR (Method:  [place]. Jeor):  2193 kcal  Activity Factor:  1.3    JESSIE:  2850 - 500 = 2350 kcal    Goals:  1.  Consider implementing daily walking routine  2.  Refer to Healthy Eating on the Go resource for lunch dining out options      Nutrition Education  The following education was provided to the patient:  *Lab results were pending at time of consult and so, not discussed with patient  Discussed meal planning/USDA's My Plate design, encouraging 1/2 plate vegetables with meals.  Discussed weight management/energy balance.  Suggested dietary modifications based on current dietary behaviors and individual food preferences.  Discussed tips for eating healthy when dining out.  Discussed importance of small behavior/habit changes in improving long-term adherence and sustainability.  Provided ongoing support, encouragement, and guidance toward improved health efforts.    Patient verbalized understanding of nutrition education and recommendations received.    Handouts  Provided  Meal Planning Guide  Restaurant Guide  Eat Fit Shopping List  The Plate Method  Healthy Snacking  Fueling Well On-The-Go    Monitoring/Evaluation    Monitor the following:  Weight  BMI  % Body Fat  Caloric intake  Labs:  CMP, Lipid Panel, Hgb A1c    Follow Up Plan:  Communication with referring healthcare provider is unnecessary at this time as patient presented as part of annual wellness exam.  However, will follow up with patient in 1-2 years.

## 2022-10-20 NOTE — LETTER
2022    Narendra Watson   [address] [place] [address]             [address]  [address] [facility] [place] [address]  Phone: [phone]  Fax: [phone]   Dear Mr. Watson:    On 2022, it was a pleasure to see you again and perform your sixth [facility] evaluation. Your family physician is Dr. Nena Little. Your neurologist is Dr. Jerome Richardson. At the time of this visit, you are 52 years old. You denied any specific complaints or concerns during the visit.    YOUR PAST MEDICAL HISTORY includes migraine headaches, low testosterone, allergic rhinitis, insomnia, and vertigo.    YOUR PAST SURGICAL HISTORY includes a back procedure, an inner ear surgery, and a colonoscopy with polypectomy.    YOUR CURRENT MEDICATIONS include Inderal 20 mg twice a day, Ajovy monthly injections, Ubrelvy 100 mg daily as needed, Maxalt 10 mg daily as needed, Celebrex 200 mg daily, Depo-Testosterone injections, Kenalog topically as needed, Nystatin topically as needed, Antivert 25 mg daily as needed, Zofran 4 mg daily as needed, Zyrtec 10 mg daily as needed, and Ambien 10 mg nightly as needed.    You do not have ANY KNOWN DRUG ALLERGIES.    YOUR SOCIAL HISTORY includes employment by the [employer]. You are  with one daughter. You denied any tobacco or illicit drug use.You drink alcohol on occasion.    YOUR FAMILY HISTORY includes your father, , with diabetes mellitus, coronary artery disease, and a heart attack. Your mother, , with Alzheimer's dementia.  Your brother had a pulmonary embolus.    No known colon or prostate cancer.    YOUR ROUTINE HEALTH MAINTENANCE includes a tetanus booster in 2018, a flu vaccine in 2021, a completed primary COVID vaccine series but no booster, no previous shingles vaccines, and a reported colonoscopy with polypectomy in .       PHYSICAL EXAMINATION:  You are 6 feet 1 inch tall,  weighing 282 pounds, with a body mass index of 37.You remain in the Severely Obese category. Your blood pressure is 130/79. Your heart rate is 68 beats per minute. All of these are acceptable values. Other than your Obesity, your physical examination did not reveal any significant abnormal findings.    YOUR BLOOD WORK AND ADDITIONAL TESTS: Reveal normal white cell counts, red cell counts and platelet levels. You are not anemic. Your glucose, kidney, liver, and electrolyte tests are normal. Your total cholesterol is 166.    Your triglycerides are 123. Your HDL is 37. Your LDL is 104. Based on these numbers, your 10-year risk of heart attack or stroke is approximately 4%. Your hemoglobin A1c is 5.4%, which is in a normal range. You do not have prediabetes or diabetes. Your TSH is 0.567, which is in a normal range. You do not have an active thyroid problem. Your urinalysis is normal.  Your serum PSA is 0.99, which is in a normal range.  You do not have any suspicions for prostate cancer.  In Your serum lead level is < today 1, which is in a normal range.    An Audiologic exam was not done with your history of hearing loss and cochlear implants.    In summary, you are overall in good health. You remain Obese. This puts  you at increased risk for medical problems in the future. Lifestyle modifications  to promote weight loss, like regular physical activity and decreased caloric intake, are encouraged. Your 10-year risk of heart attack or stroke is low.   Daily aspirin or cholesterol medications are not recommended. Since your labs are stable, you may safely continue all your current medications.    Based on the [place] Preventive Task Force recommendations, you should receive yearly flu vaccines. You should have a tetanus booster every 10 years. You are eligible for a COVID booster. You should complete the shingles vaccine series.  Based based on your report, you will be due for repeat colonoscopy in 2026. You  received a flu vaccine today.      It was a pleasure to see you again and perform this Executive Health evaluation. If I can be of any further assistance, or if you have any additional questions or concerns, please do not hesitate to let me know.    Sincerely,      Yash Campbell MD, FAAFP

## 2022-10-21 LAB
LEAD BLD-MCNC: <1 MCG/DL
SPECIMEN SOURCE: NORMAL
STATE OF RESIDENCE: NORMAL

## 2023-10-02 DIAGNOSIS — Z00.00 ROUTINE GENERAL MEDICAL EXAMINATION AT A HEALTH CARE FACILITY: Primary | ICD-10-CM

## 2023-10-23 NOTE — PROGRESS NOTES
"Nutrition Assessment  Session Time:  30 minutes      Client name:  Narendra Watson  :  1970  Age:  53 y.o.  Gender:  male    Client states:  [employer] employee present for annual physical.  Last physical and nutrition consultation: 2022    Reports losing approximately 40 lbs since last physical.  Began working towards weight loss in March.  Lost 10 lbs in 1 month and maintained new weight until August then weight started decreasing again.  Would like to reach goal weight of 225 lbs.     Currently following a low carbohydrate style diet + consumes small portions overall.  Aims to consume no more than 20 grams of carbohydrates per day.   Takes a daily fiber supplement.     Sample intake:  Breakfast: core protein shake  Lunch: chicken salad with pork skins// tuna fish  Snack: nuts  Dinner: 1-2 vegetables + protein (variety of options)  Snack: low fat cottage cheese + jello + sugar free cool whip  Drinks: water, coffee, diet soda    Exercise: no intentional exercise, active job      Anthropometrics  Height:  73"     Weight:  246 lbs  BMI:  32.46  % Body Fat:  n/a    Clinical Signs/Symptoms  N/V/D:  none noted  Appetite:  good       Past Medical History:   Diagnosis Date    Allergic rhinitis     Hypotestosteronism     Insomnia     Migraine     Vertigo        Past Surgical History:   Procedure Laterality Date    BACK SURGERY      INNER EAR SURGERY         Medications    has a current medication list which includes the following prescription(s): baclofen, betahistine (bulk), celecoxib, cetirizine, clonazepam, ajovy autoinjector, meclizine, methazolamide, montelukast, ondansetron, propranolol, rizatriptan, testosterone cypionate, tizanidine, ubrelvy, and zolpidem.    Vitamins, Minerals, and/or Supplements:  fiber    Food/Medication Interactions:  Reviewed     Food Allergies or Intolerances:  NKFA noted in chart     Social History    Marital status:    Employment:  [employer]    Social History "     Tobacco Use    Smoking status: Never    Smokeless tobacco: Never   Substance Use Topics    Alcohol use: Yes        Lab Reports   Sodium   Date Value Ref Range Status   10/20/2022 142 136 - 145 mmol/L Final     Potassium   Date Value Ref Range Status   10/20/2022 4.3 3.5 - 5.1 mmol/L Final     Chloride   Date Value Ref Range Status   10/20/2022 109 95 - 110 mmol/L Final     CO2   Date Value Ref Range Status   10/20/2022 23 23 - 29 mmol/L Final     Glucose   Date Value Ref Range Status   10/20/2022 105 70 - 110 mg/dL Final     BUN   Date Value Ref Range Status   10/20/2022 9 6 - 20 mg/dL Final     Creatinine   Date Value Ref Range Status   10/20/2022 0.8 0.5 - 1.4 mg/dL Final     Calcium   Date Value Ref Range Status   10/20/2022 9.5 8.7 - 10.5 mg/dL Final     Total Protein   Date Value Ref Range Status   10/20/2022 6.5 6.0 - 8.4 g/dL Final     Albumin   Date Value Ref Range Status   10/20/2022 3.8 3.5 - 5.2 g/dL Final     Total Bilirubin   Date Value Ref Range Status   10/20/2022 0.5 0.1 - 1.0 mg/dL Final     Comment:     For infants and newborns, interpretation of results should be based  on gestational age, weight and in agreement with clinical  observations.    Premature Infant recommended reference ranges:  Up to 24 hours.............<8.0 mg/dL  Up to 48 hours............<12.0 mg/dL  3-5 days..................<15.0 mg/dL  6-29 days.................<15.0 mg/dL       Alkaline Phosphatase   Date Value Ref Range Status   10/20/2022 76 55 - 135 U/L Final     AST   Date Value Ref Range Status   10/20/2022 17 10 - 40 U/L Final     ALT   Date Value Ref Range Status   10/20/2022 11 10 - 44 U/L Final     Anion Gap   Date Value Ref Range Status   10/20/2022 10 8 - 16 mmol/L Final     eGFR if    Date Value Ref Range Status   10/18/2021 >60 >60 mL/min/1.73 m^2 Final     eGFR if non    Date Value Ref Range Status   10/18/2021 >60 >60 mL/min/1.73 m^2 Final     Comment:     Calculation used to  obtain the estimated glomerular filtration  rate (eGFR) is the CKD-EPI equation.         Lab Results   Component Value Date    WBC 6.17 10/20/2022    HGB 14.3 10/20/2022    HCT 43.8 10/20/2022    MCV 86 10/20/2022     10/20/2022        Lab Results   Component Value Date    CHOL 166 10/20/2022     Lab Results   Component Value Date    HDL 37 (L) 10/20/2022     Lab Results   Component Value Date    LDLCALC 104.4 10/20/2022     Lab Results   Component Value Date    TRIG 123 10/20/2022     Lab Results   Component Value Date    CHOLHDL 22.3 10/20/2022     Lab Results   Component Value Date    HGBA1C 5.4 10/20/2022     BP Readings from Last 1 Encounters:   10/20/22 130/79       Food History  Provided above    Exercise History:  provided above    Cultural/Spiritual/Personal Preferences:  None identified    Support System:  family/friends    State of Change:  Action    Barriers to Change:  none    Diagnosis    obesity related to excess energy intake as evidenced by BMI 32.    Intervention    RMR (Method:  [place] [address]. Jeor):  2025 kcal  Activity Factor:  1.2    JESSIE:  2430 - 300 = 2130 kcal    Goals:  1.  Increase intake of complex carbohydrates      Nutrition Education  The following education was provided to the patient:  Complimented patient on proactive role in health maintenance.  Complimented patient on dietary compliance/modifications and resulting health improvements.  Discussed weight management.  Suggested dietary modifications based on current dietary behaviors and individual food preferences.  *Lab results were pending at time of consult and so, not discussed with patient.  Discussed the pros and cons of fad diets and provided recommendations regarding short and long term adherence if applicable.    Patient verbalized understanding of nutrition education and recommendations received.    Handouts Provided  none    Monitoring/Evaluation    Monitor the following:  Weight  BMI  Caloric intake  Labs:  lipid  panel, glucose, HgbA1c    Follow Up Plan:  Communication with referring healthcare provider is unnecessary at this time as patient presented as part of annual wellness exam.  However, will follow up with patient in 1-2 years.

## 2023-10-24 ENCOUNTER — CLINICAL SUPPORT (OUTPATIENT)
Dept: INTERNAL MEDICINE | Facility: CLINIC | Age: 53
End: 2023-10-24
Payer: COMMERCIAL

## 2023-10-24 ENCOUNTER — OFFICE VISIT (OUTPATIENT)
Dept: INTERNAL MEDICINE | Facility: CLINIC | Age: 53
End: 2023-10-24
Payer: COMMERCIAL

## 2023-10-24 ENCOUNTER — CLINICAL SUPPORT (OUTPATIENT)
Dept: PULMONOLOGY | Facility: CLINIC | Age: 53
End: 2023-10-24

## 2023-10-24 ENCOUNTER — CLINICAL SUPPORT (OUTPATIENT)
Dept: INTERNAL MEDICINE | Facility: CLINIC | Age: 53
End: 2023-10-24

## 2023-10-24 VITALS
WEIGHT: 246 LBS | DIASTOLIC BLOOD PRESSURE: 68 MMHG | HEART RATE: 59 BPM | SYSTOLIC BLOOD PRESSURE: 120 MMHG | TEMPERATURE: 98 F | HEIGHT: 73 IN | BODY MASS INDEX: 32.6 KG/M2

## 2023-10-24 DIAGNOSIS — Z00.00 ROUTINE GENERAL MEDICAL EXAMINATION AT A HEALTH CARE FACILITY: Primary | ICD-10-CM

## 2023-10-24 DIAGNOSIS — Z23 NEED FOR INFLUENZA VACCINATION: ICD-10-CM

## 2023-10-24 DIAGNOSIS — Z00.00 ROUTINE GENERAL MEDICAL EXAMINATION AT A HEALTH CARE FACILITY: ICD-10-CM

## 2023-10-24 DIAGNOSIS — Z71.3 DIETARY COUNSELING: Primary | ICD-10-CM

## 2023-10-24 LAB
ALBUMIN SERPL BCP-MCNC: 4 G/DL (ref 3.5–5.2)
ALP SERPL-CCNC: 62 U/L (ref 55–135)
ALT SERPL W/O P-5'-P-CCNC: 13 U/L (ref 10–44)
ANION GAP SERPL CALC-SCNC: 9 MMOL/L (ref 8–16)
AST SERPL-CCNC: 14 U/L (ref 10–40)
BILIRUB SERPL-MCNC: 0.6 MG/DL (ref 0.1–1)
BILIRUB UR QL STRIP: ABNORMAL
BRPFT: ABNORMAL
BUN SERPL-MCNC: 11 MG/DL (ref 6–20)
CALCIUM SERPL-MCNC: 9.5 MG/DL (ref 8.7–10.5)
CHLORIDE SERPL-SCNC: 110 MMOL/L (ref 95–110)
CHOLEST SERPL-MCNC: 151 MG/DL (ref 120–199)
CHOLEST/HDLC SERPL: 3.5 {RATIO} (ref 2–5)
CLARITY UR: CLEAR
CO2 SERPL-SCNC: 22 MMOL/L (ref 23–29)
COLOR UR: YELLOW
COMPLEXED PSA SERPL-MCNC: 1.2 NG/ML (ref 0–4)
CREAT SERPL-MCNC: 0.9 MG/DL (ref 0.5–1.4)
ERYTHROCYTE [DISTWIDTH] IN BLOOD BY AUTOMATED COUNT: 13.8 % (ref 11.5–14.5)
EST. GFR  (NO RACE VARIABLE): >60 ML/MIN/1.73 M^2
ESTIMATED AVG GLUCOSE: 97 MG/DL (ref 68–131)
FEF 25 75 LLN: 1.96
FEF 25 75 PRE REF: 49.6 %
FEF 25 75 REF: 3.69
FEV1 FVC LLN: 67
FEV1 FVC PRE REF: 85.7 %
FEV1 FVC REF: 78
FEV1 LLN: 3.27
FEV1 PRE REF: 77.3 %
FEV1 REF: 4.21
FVC LLN: 4.21
FVC PRE REF: 89.8 %
FVC REF: 5.41
GLUCOSE SERPL-MCNC: 105 MG/DL (ref 70–110)
GLUCOSE UR QL STRIP: NEGATIVE
HBA1C MFR BLD: 5 % (ref 4–5.6)
HCT VFR BLD AUTO: 50.6 % (ref 40–54)
HDLC SERPL-MCNC: 43 MG/DL (ref 40–75)
HDLC SERPL: 28.5 % (ref 20–50)
HGB BLD-MCNC: 16.5 G/DL (ref 14–18)
HGB UR QL STRIP: NEGATIVE
KETONES UR QL STRIP: ABNORMAL
LDLC SERPL CALC-MCNC: 97.2 MG/DL (ref 63–159)
LEUKOCYTE ESTERASE UR QL STRIP: NEGATIVE
MCH RBC QN AUTO: 28.5 PG (ref 27–31)
MCHC RBC AUTO-ENTMCNC: 32.6 G/DL (ref 32–36)
MCV RBC AUTO: 87 FL (ref 82–98)
NITRITE UR QL STRIP: NEGATIVE
NONHDLC SERPL-MCNC: 108 MG/DL
PEF LLN: 7.87
PEF PRE REF: 80 %
PEF REF: 10.38
PH UR STRIP: 6 [PH] (ref 5–8)
PLATELET # BLD AUTO: 248 K/UL (ref 150–450)
PMV BLD AUTO: 9.7 FL (ref 9.2–12.9)
POTASSIUM SERPL-SCNC: 4.6 MMOL/L (ref 3.5–5.1)
PRE FEF 25 75: 1.83 L/S (ref 1.96–5.42)
PRE FET 100: 14.7 SEC
PRE FEV1 FVC: 67.06 % (ref 67.17–89.28)
PRE FEV1: 3.26 L (ref 3.27–5.16)
PRE FVC: 4.86 L (ref 4.21–6.61)
PRE PEF: 8.31 L/S (ref 7.87–12.89)
PROT SERPL-MCNC: 7.1 G/DL (ref 6–8.4)
PROT UR QL STRIP: NEGATIVE
RBC # BLD AUTO: 5.79 M/UL (ref 4.6–6.2)
SODIUM SERPL-SCNC: 141 MMOL/L (ref 136–145)
SP GR UR STRIP: >=1.03 (ref 1–1.03)
TRIGL SERPL-MCNC: 54 MG/DL (ref 30–150)
TSH SERPL DL<=0.005 MIU/L-ACNC: 0.79 UIU/ML (ref 0.4–4)
URN SPEC COLLECT METH UR: ABNORMAL
WBC # BLD AUTO: 6.95 K/UL (ref 3.9–12.7)

## 2023-10-24 PROCEDURE — 80061 LIPID PANEL: CPT | Performed by: FAMILY MEDICINE

## 2023-10-24 PROCEDURE — 99396 PREV VISIT EST AGE 40-64: CPT | Mod: S$GLB,,, | Performed by: FAMILY MEDICINE

## 2023-10-24 PROCEDURE — 90686 FLU VACCINE (QUAD) GREATER THAN OR EQUAL TO 3YO PRESERVATIVE FREE IM: ICD-10-PCS | Mod: ,,, | Performed by: FAMILY MEDICINE

## 2023-10-24 PROCEDURE — 99211 OFF/OP EST MAY X REQ PHY/QHP: CPT | Mod: S$GLB,,, | Performed by: INTERNAL MEDICINE

## 2023-10-24 PROCEDURE — 84153 ASSAY OF PSA TOTAL: CPT | Performed by: FAMILY MEDICINE

## 2023-10-24 PROCEDURE — 80053 COMPREHEN METABOLIC PANEL: CPT | Performed by: FAMILY MEDICINE

## 2023-10-24 PROCEDURE — 84443 ASSAY THYROID STIM HORMONE: CPT | Performed by: FAMILY MEDICINE

## 2023-10-24 PROCEDURE — 94010 BREATHING CAPACITY TEST: ICD-10-PCS | Mod: S$GLB,,, | Performed by: INTERNAL MEDICINE

## 2023-10-24 PROCEDURE — 85027 COMPLETE CBC AUTOMATED: CPT | Performed by: FAMILY MEDICINE

## 2023-10-24 PROCEDURE — 97802 MEDICAL NUTRITION INDIV IN: CPT | Mod: S$GLB,,, | Performed by: DIETITIAN, REGISTERED

## 2023-10-24 PROCEDURE — 99396 PR PREVENTIVE VISIT,EST,40-64: ICD-10-PCS | Mod: S$GLB,,, | Performed by: FAMILY MEDICINE

## 2023-10-24 PROCEDURE — 94010 BREATHING CAPACITY TEST: CPT | Mod: S$GLB,,, | Performed by: INTERNAL MEDICINE

## 2023-10-24 PROCEDURE — 90471 IMMUNIZATION ADMIN: CPT | Mod: ,,, | Performed by: FAMILY MEDICINE

## 2023-10-24 PROCEDURE — 90686 IIV4 VACC NO PRSV 0.5 ML IM: CPT | Mod: ,,, | Performed by: FAMILY MEDICINE

## 2023-10-24 PROCEDURE — 97802 PR MED NUTR THER, 1ST, INDIV, EA 15 MIN: ICD-10-PCS | Mod: S$GLB,,, | Performed by: DIETITIAN, REGISTERED

## 2023-10-24 PROCEDURE — 90471 FLU VACCINE (QUAD) GREATER THAN OR EQUAL TO 3YO PRESERVATIVE FREE IM: ICD-10-PCS | Mod: ,,, | Performed by: FAMILY MEDICINE

## 2023-10-24 PROCEDURE — 81003 URINALYSIS AUTO W/O SCOPE: CPT | Performed by: FAMILY MEDICINE

## 2023-10-24 PROCEDURE — 83655 ASSAY OF LEAD: CPT | Performed by: FAMILY MEDICINE

## 2023-10-24 PROCEDURE — 99999 PR PBB SHADOW E&M-EST. PATIENT-LVL IV: ICD-10-PCS | Mod: PBBFAC,,, | Performed by: FAMILY MEDICINE

## 2023-10-24 PROCEDURE — 83036 HEMOGLOBIN GLYCOSYLATED A1C: CPT | Performed by: FAMILY MEDICINE

## 2023-10-24 PROCEDURE — 99999 PR PBB SHADOW E&M-EST. PATIENT-LVL IV: CPT | Mod: PBBFAC,,, | Performed by: FAMILY MEDICINE

## 2023-10-24 PROCEDURE — 99211 PR NURSE VISIT, 15 MINS, EXEC HLTH ONLY: ICD-10-PCS | Mod: S$GLB,,, | Performed by: INTERNAL MEDICINE

## 2023-10-24 NOTE — LETTER
2023    Narendra Watson   [address] [place] [address]             [address]  [address] [facility] [place] [address]  Phone: [phone]  Fax: [phone]   Dear Mr. Watson:    On 2023, it was a pleasure to see you again and perform your seventh [facility] evaluation. Your family physician is Dr. Nena Little. Your neurologist is Dr. Jerome Richardson. At the time of this visit, you are 53 years old. You denied any specific complaints or concerns during the visit.    YOUR PAST MEDICAL HISTORY includes migraine headaches, low testosterone, allergic rhinitis, insomnia, hearing loss, and vertigo.    YOUR PAST SURGICAL HISTORY includes a back procedure, an inner ear surgery, cochlear implants, and a colonoscopy with polypectomy.    YOUR CURRENT MEDICATIONS include Inderal 20 mg twice a day, Ajovy monthly injections, Ubrelvy 100 mg daily as needed, Maxalt 10 mg daily as needed, Celebrex 200 mg daily, Depo-Testosterone injections, Neptazane 25 mg nightly, Singulair 10 mg daily,  Antivert 25 mg daily as needed, Zofran 4 mg daily as needed, Zyrtec 10 mg daily as needed, and Ambien 10 mg nightly as needed.    YOUR KNOWN DRUG ALLERGIES include Topamax.    YOUR SOCIAL HISTORY includes employment by the [employer]. You are  with one daughter. You denied any tobacco or illicit drug use.You drink alcohol on occasion.    YOUR FAMILY HISTORY includes your father, , with diabetes mellitus, coronary artery disease, and a heart attack. Your mother, , with Alzheimer's dementia.  Your brother had a pulmonary embolus.    No known colon or prostate cancer.    YOUR ROUTINE HEALTH MAINTENANCE includes a tetanus booster in 2018, a flu vaccine in 2022, a completed primary COVID vaccine series but no booster, no previous shingles vaccines, and a reported colonoscopy with polypectomy in .     PHYSICAL EXAMINATION:  You  are 6 feet 1 inch tall, weighing 246 pounds, with a body mass index of 32. With your weight loss since last year's visit, you are now in the Obese category. You no longer remain in the severely obese category. Your blood pressure is 120/68. Your heart rate is 59 beats per minute. All of these are acceptable values. Other than your Obesity, your physical examination did not reveal any significant abnormal findings.    YOUR BLOOD WORK AND ADDITIONAL TESTS: Reveal normal white cell counts, red cell counts and platelet levels. You are not anemic. Your glucose, kidney, liver, and electrolyte tests are normal. Your total cholesterol is 151.    Your triglycerides are 51. Your HDL is 43. Your LDL is 97. Based on these numbers, your 10-year risk of heart attack or stroke is approximately 4%. Your hemoglobin A1c is 5.0%, which is in a normal range. You do not have prediabetes or diabetes. Your TSH is 0.787, which is in a normal range. You do not have an active thyroid problem. Your urinalysis is very concentrated with a specific gravity 1.030, but otherwise is normal. Your serum PSA is 1.2, which is in a normal range. You do not have any suspicions for prostate cancer. Your serum lead level is in a normal range.    An Audiologic exam was not done with your history of hearing loss and cochlear implants.    Your pulmonary function test showed mild obstructive defects in your small airways.  Since you do not have any active symptoms, no additional evaluation or treatment as needed at this time. You should continue to obtain yearly pulmonary function test.    In summary, you are overall in very good health.  Congratulations on your weight loss over the past year.  Continuing current diet and lifestyle changes to promote additional weight loss and obtain a BMI less than 30 is encouraged. Your 10-year risk of heart attack or stroke remains low. Daily aspirin or cholesterol medications are not recommended. Since your labs are  stable, you may safely continue all your current medications.    Based on the [place] Preventive Task Force recommendations, you should receive yearly flu vaccines. You should have a tetanus booster every 10 years. You are eligible for a COVID booster. You should complete the shingles vaccine series. Based based on your report, you will be due for repeat colon cancer screening in 2026. You received a flu vaccine today.      It was a pleasure to see you again and perform this Executive Health evaluation. If I can be of any further assistance, or if you have any additional questions or concerns, please do not hesitate to let me know.    Sincerely,      Yash Campbell MD, FAAFP

## 2023-10-24 NOTE — PROGRESS NOTES
"Subjective:   Patient ID: Narendra Watson is a 53 y.o. male.  Chief Complaint:  [facility]    Executive health evaluation #8  PCP Ted Little. Neurologist Dr. Jerome Richardson.    Past medical history for low testosterone, migraine headaches, allergic rhinitis, insomnia, vertigo, hearing loss, and colon polyps    Past surgical history for back surgery, inner ear surgery, cochlear implants, and a colonoscopy with polypectomy   Current medications include Maxalt 10 mg daily as needed, Celebrex 200 mg daily, Depo-Testosterone injections, Singulair 10 mg daily, Zyrtec 10 mg daily, Inderal 20 mg twice a day, Neptazane 25 mg nightly, Ajovy monthly injections, and Ubrelvy 100 mg daily as needed. Antivert 25 mg as needed, Zofran 4 mg as needed, and Ambien 10 mg daily.    No known drug allergies  Social history employed by [employer]  .  One daughter.  No tobacco illicit drug use.  Social alcohol use.    Family history includes father, , diabetes, coronary artery disease, heart attack.  Mother, , Alzheimer's.  Brother  diabetes and pulmonary embolus.  No known colon or prostate cancer.    Routine health maintenance with a tetanus booster in 2018, a flu vaccine in 2022 a completed primary COVID vaccine series but no booster, no previous shingles vaccines, and reported colonoscopy with polypectomy in .   No specific complaints concerns today.    Review of Systems   HENT:  Positive for hearing loss.      Objective:   /68   Pulse (!) 59   Temp 97.6 °F (36.4 °C)   Ht 6' 1" (1.854 m)   Wt 111.6 kg (246 lb)   BMI 32.46 kg/m²     Physical Exam  Vitals and nursing note reviewed.   Constitutional:       Appearance: Normal appearance. He is well-developed. He is obese.   HENT:      Nose: Nose normal. No mucosal edema, congestion or rhinorrhea.      Right Turbinates: Not enlarged or swollen.      Left Turbinates: Not enlarged or swollen.      Right Sinus: No " maxillary sinus tenderness or frontal sinus tenderness.      Left Sinus: No maxillary sinus tenderness or frontal sinus tenderness.      Mouth/Throat:      Mouth: No oral lesions.      Pharynx: Oropharynx is clear. Uvula midline.      Tonsils: No tonsillar exudate.   Eyes:      General: No scleral icterus.        Right eye: No discharge.         Left eye: No discharge.      Conjunctiva/sclera: Conjunctivae normal.      Right eye: Right conjunctiva is not injected.      Left eye: Left conjunctiva is not injected.   Neck:      Thyroid: No thyroid mass, thyromegaly or thyroid tenderness.      Vascular: No JVD.   Cardiovascular:      Rate and Rhythm: Regular rhythm. Bradycardia present.      Pulses:           Radial pulses are 2+ on the right side and 2+ on the left side.      Heart sounds: Normal heart sounds. No murmur heard.     No friction rub. No gallop.   Pulmonary:      Effort: Pulmonary effort is normal. No accessory muscle usage or respiratory distress.      Breath sounds: Normal breath sounds. No wheezing, rhonchi or rales.   Abdominal:      General: There is no distension.      Palpations: Abdomen is soft.      Tenderness: There is no abdominal tenderness. There is no guarding or rebound.   Musculoskeletal:      Right lower leg: No edema.      Left lower leg: No edema.   Lymphadenopathy:      Cervical: No cervical adenopathy.   Skin:     General: Skin is warm and dry.      Findings: No rash.   Neurological:      Mental Status: He is alert and oriented to person, place, and time.      Coordination: Coordination normal.      Gait: Gait normal.   Psychiatric:         Attention and Perception: Attention and perception normal.         Mood and Affect: Mood normal.         Speech: Speech normal.         Behavior: Behavior normal.         Thought Content: Thought content normal.         Cognition and Memory: Cognition and memory normal.         Judgment: Judgment normal.       Assessment:       ICD-10-CM ICD-9-CM    1. Routine general medical examination at a health care facility  Z00.00 V70.0     Plan:   Routine general medical examination at a health care facility  Need for influenza vaccination  -     Influenza - Quadrivalent (PF)    Blood pressure normal.  BMI 32.5.  Overall exam stable.    Send full executive Health letter when all test resulted.    Reported colon cancer screening up-to-date  Prostate cancer screening today  Tetanus booster up-to-date  Recommend COVID booster through pharmacy   Recommend shingles vaccine through pharmacy  Flu vaccine today  Follow-up with PCP and specialists scheduled  Return to clinic 1 year for executive Health evaluation

## 2023-10-26 LAB
CITY: NORMAL
COUNTY: NORMAL
GUARDIAN FIRST NAME: NORMAL
GUARDIAN LAST NAME: NORMAL
LEAD BLD-MCNC: <1 MCG/DL
PHONE #: NORMAL
POSTAL CODE: NORMAL
RACE: NORMAL
STATE OF RESIDENCE: NORMAL
STREET ADDRESS: NORMAL

## 2024-01-24 ENCOUNTER — RESEARCH ENCOUNTER (OUTPATIENT)
Dept: RESEARCH | Facility: HOSPITAL | Age: 54
End: 2024-01-24
Payer: COMMERCIAL

## 2024-01-24 DIAGNOSIS — Z00.6 RESEARCH STUDY PATIENT: Primary | ICD-10-CM

## 2024-01-24 NOTE — RESEARCH
Sponsor: [employer]     Study Title/IRB Number: [id number]    Principle Investigator: Dr. Ravinder Kidd    Patient eligibility was checked prior to enrollment in the study. Patient met the following inclusion and exclusion criteria:     INCLUSION CRITERIA  Participant age is 50 years or older at the time of signing the Informed Consent form  Participant is capable of giving signed Informed Consent, which includes compliance with the requirements and restrictions in the informed consent form and the protocol    EXCLUSION CRITERIA  Participant is undergoing or referred for diagnostic evaluation due to clinical suspicion for cancer  Participant has a personal history of invasive or hematologic malignancy, diagnosed within the last 3 years prior to expected enrollment date or diagnosed greater than 3 years prior to expected enrollment date and never treated  Participant has had definitive treatment for invasive or hematologic malignancy within the 3 years prior to expected enrollment date  Participant is not able to comply with protocol procedures  Participant is not a current patient at a participating center  Participant is currently enrolled or was previously enrolled in another [employer]-sponsored study  Participant is current or previous employee/contractor of [employer]  Participant is currently pregnant (by participant's self-report of pregnancy status)    Prior to the Informed Consent (IC) being signed, or any study protocol required data collection, testing, procedure, or intervention being performed, the following was done and/or discussed:  Patient was given a copy of the IC for review   Purpose of the study and qualifications to participate   Study design, Follow up schedule, and tests or procedures done at each visit  Confidentiality and HIPAA Authorization for Release of Medical Records for the research trial/ subject's rights/research related injury  Risk, Benefits, Alternative Treatments, Compensation and  "Costs  Participation in the research trial is voluntary and patient may withdraw at anytime  Contact information for study related questions    Patient verbalizes understanding of the above: Yes  Contact information for CRC and PI given to patient: Yes  Patient able to adequately summarize: the purpose of the study, the risks associated with the study, and all procedures, testing, and follow-ups associated with the study: Yes    Patient signed the informed consent form for the research study with an IRB approval date of 4/25/2022. Each page of the consent form was reviewed with patient and all questions answered satisfactorily.   Patient received a copy of the consent form. The original consent was scanned into electronic medical records.    Anthropometric Data    Participant is a 53 y.o., White, Not  or /a, male  Participant height: 6' 0"   Participant weight: 245    Smoking History and Alcohol use     Please indicate whether the participant smoked at least 100 cigarettes in their lifetime:   No  Please indicate whether the participant is a current smoker:  No  Age participant started smoking cigarettes: Not Applicable  Age participant stopped smoking cigarettes: Not Applicable  During their time as a smoker, please indicate if the participant stopped smoking for a month or more: Not Applicable  Please indicate how many cigarettes per day did the participant smoke on average for the majority of their time as a smoker: Blank single: Not Applicable    During the last 12 months, how often did the participant have any kind of drink containing alcohol? Count as one drink a 12 ounce can or glass of beer, a 5 ounce glass of wine, or a drink containing 1 shot of liquor. Once a week}  During the last 12 months, how many drinks did the participant have on days when they drank alcohol?1 drink per day    Blood Draw    Following IC being signed and prior to blood draw, patient completed all baseline/pretest " questionnaires. The following specimens were collected from the pt at the time of this encounter via peripheral blood draw.    Blood draw location: Left Arm  Needle used: 21 gauge butterfly needle  Blood draw amount: 40ml  Blood draw time: 0910 1/24/2024

## 2024-02-06 ENCOUNTER — TELEPHONE (OUTPATIENT)
Dept: RESEARCH | Facility: HOSPITAL | Age: 54
End: 2024-02-06
Payer: COMMERCIAL

## 2024-02-06 NOTE — TELEPHONE ENCOUNTER
[facility] 2022.003    Talia ID: [id number]      Results the [facility] Early Detection test and were reviewed by PI Dr Kidd. Patient was called and notified of test results, there was no signal detected.    Patient reminded, this was a screening test, so we still highly encourage them to continue other normal health screenings  and to continue to adhere to guideline-recommended cancer screening.    Patient was asked about completing 30 day questionnaire online and was agreeable.

## 2024-09-23 DIAGNOSIS — Z00.00 ROUTINE GENERAL MEDICAL EXAMINATION AT A HEALTH CARE FACILITY: Primary | ICD-10-CM

## 2024-10-29 ENCOUNTER — CLINICAL SUPPORT (OUTPATIENT)
Dept: INTERNAL MEDICINE | Facility: CLINIC | Age: 54
End: 2024-10-29

## 2024-10-29 ENCOUNTER — CLINICAL SUPPORT (OUTPATIENT)
Dept: INTERNAL MEDICINE | Facility: CLINIC | Age: 54
End: 2024-10-29
Payer: COMMERCIAL

## 2024-10-29 ENCOUNTER — CLINICAL SUPPORT (OUTPATIENT)
Dept: PULMONOLOGY | Facility: CLINIC | Age: 54
End: 2024-10-29

## 2024-10-29 ENCOUNTER — OFFICE VISIT (OUTPATIENT)
Dept: INTERNAL MEDICINE | Facility: CLINIC | Age: 54
End: 2024-10-29

## 2024-10-29 ENCOUNTER — HOSPITAL ENCOUNTER (OUTPATIENT)
Dept: CARDIOLOGY | Facility: HOSPITAL | Age: 54
Discharge: HOME | End: 2024-10-29
Attending: FAMILY MEDICINE

## 2024-10-29 VITALS
HEART RATE: 69 BPM | SYSTOLIC BLOOD PRESSURE: 117 MMHG | DIASTOLIC BLOOD PRESSURE: 75 MMHG | TEMPERATURE: 97 F | BODY MASS INDEX: 33.27 KG/M2 | WEIGHT: 251 LBS | HEIGHT: 73 IN

## 2024-10-29 DIAGNOSIS — Z00.00 ROUTINE GENERAL MEDICAL EXAMINATION AT A HEALTH CARE FACILITY: Primary | ICD-10-CM

## 2024-10-29 DIAGNOSIS — Z00.00 ROUTINE GENERAL MEDICAL EXAMINATION AT A HEALTH CARE FACILITY: ICD-10-CM

## 2024-10-29 DIAGNOSIS — Z23 NEED FOR VACCINATION: ICD-10-CM

## 2024-10-29 DIAGNOSIS — Z12.9 CANCER SCREENING: Primary | ICD-10-CM

## 2024-10-29 LAB
ALBUMIN SERPL BCP-MCNC: 3.9 G/DL (ref 3.5–5.2)
ALP SERPL-CCNC: 63 U/L (ref 40–150)
ALT SERPL W/O P-5'-P-CCNC: 16 U/L (ref 10–44)
ANION GAP SERPL CALC-SCNC: 10 MMOL/L (ref 8–16)
AST SERPL-CCNC: 15 U/L (ref 10–40)
BILIRUB SERPL-MCNC: 0.5 MG/DL (ref 0.1–1)
BILIRUB UR QL STRIP: NEGATIVE
BUN SERPL-MCNC: 13 MG/DL (ref 6–20)
CALCIUM SERPL-MCNC: 9.5 MG/DL (ref 8.7–10.5)
CHLORIDE SERPL-SCNC: 108 MMOL/L (ref 95–110)
CHOLEST SERPL-MCNC: 155 MG/DL (ref 120–199)
CHOLEST/HDLC SERPL: 4.3 {RATIO} (ref 2–5)
CLARITY UR: CLEAR
CO2 SERPL-SCNC: 21 MMOL/L (ref 23–29)
COLOR UR: YELLOW
COMPLEXED PSA SERPL-MCNC: 1.7 NG/ML (ref 0–4)
CREAT SERPL-MCNC: 0.9 MG/DL (ref 0.5–1.4)
CV STRESS BASE HR: 65 BPM
DIASTOLIC BLOOD PRESSURE: 75 MMHG
ERYTHROCYTE [DISTWIDTH] IN BLOOD BY AUTOMATED COUNT: 13.9 % (ref 11.5–14.5)
EST. GFR  (NO RACE VARIABLE): >60 ML/MIN/1.73 M^2
ESTIMATED AVG GLUCOSE: 103 MG/DL (ref 68–131)
GLUCOSE SERPL-MCNC: 99 MG/DL (ref 70–110)
GLUCOSE UR QL STRIP: NEGATIVE
HBA1C MFR BLD: 5.2 % (ref 4–5.6)
HCT VFR BLD AUTO: 47.6 % (ref 40–54)
HDLC SERPL-MCNC: 36 MG/DL (ref 40–75)
HDLC SERPL: 23.2 % (ref 20–50)
HGB BLD-MCNC: 16 G/DL (ref 14–18)
HGB UR QL STRIP: NEGATIVE
KETONES UR QL STRIP: NEGATIVE
LDLC SERPL CALC-MCNC: 70.6 MG/DL (ref 63–159)
LEUKOCYTE ESTERASE UR QL STRIP: NEGATIVE
MCH RBC QN AUTO: 29.1 PG (ref 27–31)
MCHC RBC AUTO-ENTMCNC: 33.6 G/DL (ref 32–36)
MCV RBC AUTO: 87 FL (ref 82–98)
NITRITE UR QL STRIP: NEGATIVE
NONHDLC SERPL-MCNC: 119 MG/DL
OHS CV CPX 85 PERCENT MAX PREDICTED HEART RATE MALE: 142
OHS CV CPX ESTIMATED METS: 10
OHS CV CPX MAX PREDICTED HEART RATE: 167
OHS CV CPX PATIENT IS FEMALE: 0
OHS CV CPX PATIENT IS MALE: 1
OHS CV CPX PEAK DIASTOLIC BLOOD PRESSURE: 82 MMHG
OHS CV CPX PEAK HEAR RATE: 142 BPM
OHS CV CPX PEAK RATE PRESSURE PRODUCT: NORMAL
OHS CV CPX PEAK SYSTOLIC BLOOD PRESSURE: 217 MMHG
OHS CV CPX PERCENT MAX PREDICTED HEART RATE ACHIEVED: 85
OHS CV CPX RATE PRESSURE PRODUCT PRESENTING: 7150
PH UR STRIP: 6 [PH] (ref 5–8)
PLATELET # BLD AUTO: 219 K/UL (ref 150–450)
PMV BLD AUTO: 9.5 FL (ref 9.2–12.9)
POTASSIUM SERPL-SCNC: 4.2 MMOL/L (ref 3.5–5.1)
PROT SERPL-MCNC: 6.9 G/DL (ref 6–8.4)
PROT UR QL STRIP: NEGATIVE
RBC # BLD AUTO: 5.5 M/UL (ref 4.6–6.2)
SODIUM SERPL-SCNC: 139 MMOL/L (ref 136–145)
SP GR UR STRIP: 1.02 (ref 1–1.03)
STRESS ECHO POST EXERCISE DUR MIN: 8 MINUTES
STRESS ECHO POST EXERCISE DUR SEC: 24 SECONDS
SYSTOLIC BLOOD PRESSURE: 110 MMHG
TRIGL SERPL-MCNC: 242 MG/DL (ref 30–150)
TSH SERPL DL<=0.005 MIU/L-ACNC: 0.7 UIU/ML (ref 0.4–4)
URN SPEC COLLECT METH UR: NORMAL
WBC # BLD AUTO: 7.06 K/UL (ref 3.9–12.7)

## 2024-10-29 PROCEDURE — 85027 COMPLETE CBC AUTOMATED: CPT | Performed by: FAMILY MEDICINE

## 2024-10-29 PROCEDURE — 90471 IMMUNIZATION ADMIN: CPT | Mod: ,,, | Performed by: FAMILY MEDICINE

## 2024-10-29 PROCEDURE — 81003 URINALYSIS AUTO W/O SCOPE: CPT | Performed by: FAMILY MEDICINE

## 2024-10-29 PROCEDURE — 97802 MEDICAL NUTRITION INDIV IN: CPT | Mod: S$GLB,,, | Performed by: DIETITIAN, REGISTERED

## 2024-10-29 PROCEDURE — 83655 ASSAY OF LEAD: CPT | Performed by: FAMILY MEDICINE

## 2024-10-29 PROCEDURE — 93017 CV STRESS TEST TRACING ONLY: CPT

## 2024-10-29 PROCEDURE — 80053 COMPREHEN METABOLIC PANEL: CPT | Performed by: FAMILY MEDICINE

## 2024-10-29 PROCEDURE — 99396 PREV VISIT EST AGE 40-64: CPT | Mod: ,,, | Performed by: FAMILY MEDICINE

## 2024-10-29 PROCEDURE — 99999 PR PBB SHADOW E&M-EST. PATIENT-LVL IV: CPT | Mod: PBBFAC,,, | Performed by: FAMILY MEDICINE

## 2024-10-29 PROCEDURE — 93016 CV STRESS TEST SUPVJ ONLY: CPT | Mod: ,,, | Performed by: INTERNAL MEDICINE

## 2024-10-29 PROCEDURE — 80061 LIPID PANEL: CPT | Performed by: FAMILY MEDICINE

## 2024-10-29 PROCEDURE — 83036 HEMOGLOBIN GLYCOSYLATED A1C: CPT | Performed by: FAMILY MEDICINE

## 2024-10-29 PROCEDURE — 99199 UNLISTED SPECIAL SVC PX/RPRT: CPT | Mod: ,,, | Performed by: INTERNAL MEDICINE

## 2024-10-29 PROCEDURE — 81479 UNLISTED MOLECULAR PATHOLOGY: CPT | Mod: S$GLB,,, | Performed by: INTERNAL MEDICINE

## 2024-10-29 PROCEDURE — 90656 IIV3 VACC NO PRSV 0.5 ML IM: CPT | Mod: ,,, | Performed by: FAMILY MEDICINE

## 2024-10-29 PROCEDURE — 99211 OFF/OP EST MAY X REQ PHY/QHP: CPT | Mod: S$GLB,,, | Performed by: INTERNAL MEDICINE

## 2024-10-29 PROCEDURE — 84443 ASSAY THYROID STIM HORMONE: CPT | Performed by: FAMILY MEDICINE

## 2024-10-29 PROCEDURE — 94010 BREATHING CAPACITY TEST: CPT | Mod: ,,, | Performed by: STUDENT IN AN ORGANIZED HEALTH CARE EDUCATION/TRAINING PROGRAM

## 2024-10-29 PROCEDURE — 84153 ASSAY OF PSA TOTAL: CPT | Performed by: FAMILY MEDICINE

## 2024-10-29 PROCEDURE — 93018 CV STRESS TEST I&R ONLY: CPT | Mod: ,,, | Performed by: INTERNAL MEDICINE

## 2024-10-30 LAB
BRPFT: NORMAL
FEF 25 75 LLN: 2.31
FEF 25 75 PRE REF: 63.4 %
FEF 25 75 REF: 4.02
FEV1 FVC LLN: 67
FEV1 FVC PRE REF: 91.2 %
FEV1 FVC REF: 78
FEV1 LLN: 3.24
FEV1 PRE REF: 85 %
FEV1 REF: 4.2
FVC LLN: 4.19
FVC PRE REF: 92.8 %
FVC REF: 5.4
PEF LLN: 7.84
PEF PRE REF: 78.3 %
PEF REF: 10.36
PRE FEF 25 75: 2.55 L/S (ref 2.31–5.73)
PRE FET 100: 11.21 SEC
PRE FEV1 FVC: 71.18 % (ref 66.89–87.64)
PRE FEV1: 3.57 L (ref 3.24–5.11)
PRE FVC: 5.01 L (ref 4.19–6.63)
PRE PEF: 8.11 L/S (ref 7.84–12.88)

## 2025-01-24 ENCOUNTER — TELEPHONE (OUTPATIENT)
Dept: RESEARCH | Facility: HOSPITAL | Age: 55
End: 2025-01-24
Payer: COMMERCIAL

## 2025-01-24 NOTE — TELEPHONE ENCOUNTER
[facility]   IRB#2022.003    Contacted pt in order to complete a 1-yr cancer assessment since the date of enrollment.     No cancer screening was noted.